# Patient Record
Sex: FEMALE | Race: BLACK OR AFRICAN AMERICAN | Employment: FULL TIME | URBAN - METROPOLITAN AREA
[De-identification: names, ages, dates, MRNs, and addresses within clinical notes are randomized per-mention and may not be internally consistent; named-entity substitution may affect disease eponyms.]

---

## 2017-10-09 ENCOUNTER — OFFICE VISIT (OUTPATIENT)
Dept: FAMILY MEDICINE CLINIC | Age: 37
End: 2017-10-09

## 2017-10-09 VITALS
WEIGHT: 136.4 LBS | SYSTOLIC BLOOD PRESSURE: 108 MMHG | HEIGHT: 67 IN | TEMPERATURE: 99.5 F | HEART RATE: 97 BPM | BODY MASS INDEX: 21.41 KG/M2 | OXYGEN SATURATION: 97 % | DIASTOLIC BLOOD PRESSURE: 69 MMHG | RESPIRATION RATE: 16 BRPM

## 2017-10-09 DIAGNOSIS — Z23 ENCOUNTER FOR IMMUNIZATION: ICD-10-CM
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
## 2017-10-09 DIAGNOSIS — M54.6 THORACIC SPINE PAIN: ICD-10-CM

## 2017-10-09 DIAGNOSIS — S16.1XXA STRAIN OF NECK MUSCLE, INITIAL ENCOUNTER: ICD-10-CM

## 2017-10-09 RX ORDER — NAPROXEN 375 MG/1
375 TABLET ORAL 2 TIMES DAILY WITH MEALS
Qty: 60 TAB | Refills: 1 | Status: SHIPPED | OUTPATIENT
Start: 2017-10-09 | End: 2018-03-28

## 2017-10-09 RX ORDER — CYCLOBENZAPRINE HCL 10 MG
10 TABLET ORAL
Qty: 15 TAB | Refills: 1 | Status: SHIPPED | OUTPATIENT
Start: 2017-10-09 | End: 2018-03-28

## 2017-10-09 RX ORDER — TRAMADOL HYDROCHLORIDE 50 MG/1
50 TABLET ORAL
Qty: 30 TAB | Refills: 1 | Status: SHIPPED | OUTPATIENT
Start: 2017-10-09 | End: 2018-03-28

## 2017-10-09 NOTE — MR AVS SNAPSHOT
Visit Information Date & Time Provider Department Dept. Phone Encounter #  
 10/9/2017  9:00 AM Nehemiah Bird [phone] [id number] Follow-up Instructions Return if symptoms worsen or fail to improve. Upcoming Health Maintenance Date Due INFLUENZA AGE 9 TO ADULT 8/1/2017 PAP AKA CERVICAL CYTOLOGY 12/28/2019 DTaP/Tdap/Td series (2 - Td) 8/13/2024 Allergies as of 10/9/2017  Review Complete On: 10/9/2017 By: Sharlene Ross., DO No Known Allergies Current Immunizations  Reviewed on 1/22/2013 Name Date Influenza Vaccine 1/22/2013 Influenza Vaccine (Quad) PF  Incomplete Influenza Vaccine Whole 1/1/2007 TD Vaccine 1/1/2005 Tdap 8/13/2014  8:30 AM  
  
 Not reviewed this visit You Were Diagnosed With   
  
 Codes Comments Strain of neck muscle, initial encounter    -  Primary ICD-10-CM: S16. Jon Noriegal ICD-9-CM: 847.0 Encounter for immunization     ICD-10-CM: D84 ICD-9-CM: V03.89 Thoracic spine pain     ICD-10-CM: M54.6 ICD-9-CM: 724.1 Vitals BP Pulse Temp Resp Height(growth percentile) Weight(growth percentile) 108/69 (BP 1 Location: Right arm, BP Patient Position: Sitting) 97 99.5 °F (37.5 °C) (Oral) 16 5' 7\" (1.702 m) 136 lb 6.4 oz (61.9 kg) LMP SpO2 BMI OB Status Smoking Status 09/25/2017 (Exact Date) 97% 21.36 kg/m2 Having regular periods Never Smoker BMI and BSA Data Body Mass Index Body Surface Area  
 21.36 kg/m 2 1.71 m 2 [facility]erred [facility] [phone] Your Updated Medication List  
  
   
This list is accurate as of: 10/9/17  9:54 AM.  Always use your most recent med list.  
  
  
  
  
 cyclobenzaprine 10 mg tablet Commonly known as:  FLEXERIL Take 1 Tab by mouth nightly. naproxen 375 mg tablet Commonly known as:  NAPROSYN  
 Take 1 Tab by mouth two (2) times daily (with meals). traMADol 50 mg tablet Commonly known as:  ULTRAM  
Take 1 Tab by mouth every eight (8) hours as needed for Pain. Max Daily Amount: 150 mg. Indications: Pain Prescriptions Printed Refills  
 traMADol (ULTRAM) 50 mg tablet 1 Sig: Take 1 Tab by mouth every eight (8) hours as needed for Pain. Max Daily Amount: 150 mg. Indications: Pain Class: Print Route: Oral  
  
Prescriptions Sent to Pharmacy Refills  
 naproxen (NAPROSYN) 375 mg tablet 1 Sig: Take 1 Tab by mouth two (2) times daily (with meals). Class: Normal  
 [facility] [address] [facility] Ph #: [phone] Route: Oral  
 cyclobenzaprine (FLEXERIL) 10 mg tablet 1 Sig: Take 1 Tab by mouth nightly. Class: Normal  
 Pharmacy: [address] [facility] Ph #: [phone] Route: Oral  
  
We Performed the Following INFLUENZA VIRUS VAC QUAD,SPLIT,PRESV FREE SYRINGE IM [id number] CPT(R)] Follow-up Instructions Return if symptoms worsen or fail to improve. Introducing [facility]! 3 [facility] introduces Symform patient portal. Now you can access parts of your medical record, email your doctor's office, and request medication refills online. 1. In your internet browser, go to [email or web address] 2. Click on the First Time User? Click Here link in the Sign In box. You will see the New Member Sign Up page. 3. Enter your Symform Access Code exactly as it appears below. You will not need to use this code after youve completed the sign-up process. If you do not sign up before the expiration date, you must request a new code. · Symform Access Code: [email or web address] Expires: 1/7/2018  9:47 AM 
 
4.  Enter the last four digits of your Social Security Number (xxxx) and Date of Birth (mm/dd/yyyy) as indicated and click Submit. You will be taken to the next sign-up page. 5. Create a 8hands ID. This will be your 8hands login ID and cannot be changed, so think of one that is secure and easy to remember. 6. Create a 8hands password. You can change your password at any time. 7. Enter your Password Reset Question and Answer. This can be used at a later time if you forget your password. 8. Enter your e-mail address. You will receive e-mail notification when new information is available in [address] 9. Click Sign Up. You can now view and download portions of your medical record. 10. Click the Download Summary menu link to download a portable copy of your medical information. If you have questions, please visit the Frequently Asked Questions section of the 8hands website. Remember, 8hands is NOT to be used for urgent needs. For medical emergencies, dial 911. Now available from your iPhone and Android! Please provide this summary of care documentation to your next provider. Your primary care clinician is listed as [id number] [facility]. If you have any questions after today's visit, please call [phone].
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
## 2018-02-23 ENCOUNTER — HOSPITAL ENCOUNTER (EMERGENCY)
Age: 38
Discharge: HOME | End: 2018-02-23
Attending: EMERGENCY MEDICINE
Payer: SUBSIDIZED

## 2018-02-23 ENCOUNTER — APPOINTMENT (OUTPATIENT)
Dept: GENERAL RADIOLOGY | Age: 38
End: 2018-02-23
Attending: EMERGENCY MEDICINE
Payer: SUBSIDIZED

## 2018-02-23 VITALS
SYSTOLIC BLOOD PRESSURE: 131 MMHG | WEIGHT: 135 LBS | TEMPERATURE: 99.4 F | DIASTOLIC BLOOD PRESSURE: 94 MMHG | OXYGEN SATURATION: 100 % | BODY MASS INDEX: 21.14 KG/M2 | HEART RATE: 68 BPM | RESPIRATION RATE: 16 BRPM

## 2018-02-23 DIAGNOSIS — R05.9 COUGH: Primary | ICD-10-CM

## 2018-02-23 DIAGNOSIS — J98.8 CONGESTION OF RESPIRATORY TRACT: ICD-10-CM

## 2018-02-23 DIAGNOSIS — R07.9 CHEST PAIN, UNSPECIFIED TYPE: ICD-10-CM

## 2018-02-23 LAB — HCG UR QL: NEGATIVE

## 2018-02-23 PROCEDURE — 74011250637 HC RX REV CODE- 250/637: Performed by: EMERGENCY MEDICINE

## 2018-02-23 PROCEDURE — 94640 AIRWAY INHALATION TREATMENT: CPT

## 2018-02-23 PROCEDURE — 81025 URINE PREGNANCY TEST: CPT | Performed by: EMERGENCY MEDICINE

## 2018-02-23 PROCEDURE — 93005 ELECTROCARDIOGRAM TRACING: CPT

## 2018-02-23 PROCEDURE — 74011000250 HC RX REV CODE- 250: Performed by: EMERGENCY MEDICINE

## 2018-02-23 PROCEDURE — 99284 EMERGENCY DEPT VISIT MOD MDM: CPT

## 2018-02-23 PROCEDURE — 77030029684 HC NEB SM VOL KT MONA -A

## 2018-02-23 PROCEDURE — 71046 X-RAY EXAM CHEST 2 VIEWS: CPT

## 2018-02-23 RX ORDER — ACETAMINOPHEN 500 MG
1000 TABLET ORAL
Status: COMPLETED | OUTPATIENT
Start: 2018-02-23 | End: 2018-02-23

## 2018-02-23 RX ORDER — LORATADINE 10 MG/1
10 TABLET ORAL DAILY
Qty: 30 TAB | Refills: 0 | Status: SHIPPED | OUTPATIENT
Start: 2018-02-23 | End: 2018-03-25

## 2018-02-23 RX ORDER — IPRATROPIUM BROMIDE AND ALBUTEROL SULFATE 2.5; .5 MG/3ML; MG/3ML
3 SOLUTION RESPIRATORY (INHALATION)
Status: DISPENSED | OUTPATIENT
Start: 2018-02-23 | End: 2018-02-23

## 2018-02-23 RX ORDER — GUAIFENESIN 600 MG/1
600 TABLET, EXTENDED RELEASE ORAL 2 TIMES DAILY
Qty: 15 TAB | Refills: 0 | Status: SHIPPED | OUTPATIENT
Start: 2018-02-23 | End: 2018-03-28

## 2018-02-23 RX ORDER — AZITHROMYCIN 250 MG/1
TABLET, FILM COATED ORAL
Qty: 6 TAB | Refills: 0 | Status: SHIPPED | OUTPATIENT
Start: 2018-02-23 | End: 2018-03-28

## 2018-02-23 RX ORDER — ALBUTEROL SULFATE 90 UG/1
2 AEROSOL, METERED RESPIRATORY (INHALATION)
Qty: 1 INHALER | Refills: 0 | Status: SHIPPED | OUTPATIENT
Start: 2018-02-23 | End: 2018-03-28

## 2018-02-23 RX ADMIN — ACETAMINOPHEN 1000 MG: 500 TABLET ORAL at 18:22

## 2018-02-23 RX ADMIN — IPRATROPIUM BROMIDE AND ALBUTEROL SULFATE 3 ML: .5; 3 SOLUTION RESPIRATORY (INHALATION) at 18:23

## 2018-02-23 NOTE — ED PROVIDER NOTES
EMERGENCY DEPARTMENT HISTORY AND PHYSICAL EXAM    6:15 PM      Date: 2/23/2018  Patient Name: Jose Dial    History of Presenting Illness     Chief Complaint   Patient presents with    Chest Pain    Cough    Shortness of Breath         History Provided By: Patient    Chief Complaint: Chest Pain  Duration: 2 Weeks  Timing:  Intermittent  Location: Mid chest  Quality: Tightness  Severity: Moderate  Modifying Factors: Exacerbated with cough, no relief from OTC meds  Associated Symptoms: Cough      Additional History (Context):6:24 PM Jose Dial is a 40 y.o. female with h/o Hypoglycemia who presents to ED complaining of intermittent moderate mid chest tightness associated and exacerbated with cough onset 2 weeks ago. Patient states that she had the FLU over 2 weeks ago and the cough came on at the end of the FLU sx. States that she hs tried OTC medication with no relief. Has had similar chest tightness and cough with acute bronchitis. No other concerns or symptoms at this time. PCP: Nay Cui., DO      Current Facility-Administered Medications   Medication Dose Route Frequency Provider Last Rate Last Dose    albuterol-ipratropium (DUO-NEB) 2.5 MG-0.5 MG/3 ML  3 mL Nebulization Q30MIN Itzel Edwards MD   3 mL at 02/23/18 1824     Current Outpatient Prescriptions   Medication Sig Dispense Refill    azithromycin (ZITHROMAX) 250 mg tablet Take 2 tablets PO on day 1 then 1 tab PO every day for days 2-5 6 Tab 0    albuterol (PROVENTIL HFA, VENTOLIN HFA, PROAIR HFA) 90 mcg/actuation inhaler Take 2 Puffs by inhalation every four (4) hours as needed for Wheezing. 1 Inhaler 0    loratadine (CLARITIN) 10 mg tablet Take 1 Tab by mouth daily for 30 days. 30 Tab 0    inhalational spacing device 1 Each by Does Not Apply route as needed. 1 Device 0    guaiFENesin ER (MUCINEX) 600 mg ER tablet Take 1 Tab by mouth two (2) times a day.  15 Tab 0    naproxen (NAPROSYN) 375 mg tablet Take 1 Tab by mouth two (2) times daily (with meals). 60 Tab 1    cyclobenzaprine (FLEXERIL) 10 mg tablet Take 1 Tab by mouth nightly. 15 Tab 1    traMADol (ULTRAM) 50 mg tablet Take 1 Tab by mouth every eight (8) hours as needed for Pain. Max Daily Amount: 150 mg. Indications: Pain 30 Tab 1       Past History     Past Medical History:  Past Medical History:   Diagnosis Date    Acne     Anemia NEC     Calculus of kidney     Depression     anxiety    HPV in female     HSV infection     Hypoglycemia     PID (pelvic inflammatory disease)        Past Surgical History:  Past Surgical History:   Procedure Laterality Date    COSMETIC RATE ADJ  03/02/11    abdominal plastic    HX LIPOSUCTION  3/15/13    HX OTHER SURGICAL  05/04/12    Calf implant    HX TUBAL LIGATION  9/18/12       Family History:  Family History   Problem Relation Age of Onset    Hypertension Maternal Grandmother     Hypertension Maternal Grandfather        Social History:  Social History   Substance Use Topics    Smoking status: Never Smoker    Smokeless tobacco: Never Used    Alcohol use Yes      Comment: Occassionally. Allergies:  No Known Allergies      Review of Systems       Review of Systems   Constitutional: Negative for activity change, fatigue and fever. HENT: Negative for congestion and rhinorrhea. Eyes: Negative for visual disturbance. Respiratory: Positive for cough. Negative for shortness of breath. Cardiovascular: Positive for chest pain (Tightness). Negative for palpitations. Gastrointestinal: Negative for abdominal pain, diarrhea, nausea and vomiting. Genitourinary: Negative for dysuria and hematuria. Musculoskeletal: Negative for back pain. Skin: Negative for rash. Neurological: Negative for dizziness, weakness and light-headedness. Psychiatric/Behavioral: Negative for agitation. All other systems reviewed and are negative.         Physical Exam     Visit Vitals    /81 (BP Patient Position: At rest)    Pulse 68    Temp 99.4 °F (37.4 °C)    Resp 16    Wt 61.2 kg (135 lb)    LMP 02/19/2018 (Exact Date)    SpO2 100%    BMI 21.14 kg/m2         Physical Exam   Constitutional: She appears well-developed and well-nourished. No distress. HENT:   Head: Normocephalic and atraumatic. Right Ear: External ear normal.   Left Ear: External ear normal.   Nose: Nose normal.   Mouth/Throat: Oropharynx is clear and moist.   Eyes: Conjunctivae and EOM are normal. Pupils are equal, round, and reactive to light. No scleral icterus. Neck: Normal range of motion. Neck supple. No JVD present. No tracheal deviation present. No thyromegaly present. Cardiovascular: Normal rate and regular rhythm. Exam reveals no friction rub. No murmur heard. Pulmonary/Chest: Effort normal and breath sounds normal. No stridor. She exhibits no tenderness. Abdominal: Soft. Bowel sounds are normal. She exhibits no distension. There is no tenderness. There is no rebound and no guarding. Musculoskeletal: Normal range of motion. She exhibits no edema or tenderness. Lymphadenopathy:     She has no cervical adenopathy. Neurological: She is alert. No cranial nerve deficit. Coordination normal.   Skin: Skin is warm and dry. Psychiatric: She has a normal mood and affect. Her behavior is normal. Judgment and thought content normal.   Nursing note and vitals reviewed.         Diagnostic Study Results     Labs -  Recent Results (from the past 12 hour(s))   EKG, 12 LEAD, INITIAL    Collection Time: 02/23/18  5:43 PM   Result Value Ref Range    Ventricular Rate 71 BPM    Atrial Rate 71 BPM    P-R Interval 168 ms    QRS Duration 78 ms    Q-T Interval 392 ms    QTC Calculation (Bezet) 425 ms    Calculated P Axis 30 degrees    Calculated R Axis 76 degrees    Calculated T Axis 71 degrees    Diagnosis       Normal sinus rhythm  Normal ECG  No previous ECGs available         Radiologic Studies -   XR CHEST PA LAT    Preliminary Result  IMPRESSION:    No acute process. Interpreted by Surjit Duckworth MD 6:38 PM      Medical Decision Making   I am the first provider for this patient. I reviewed the vital signs, available nursing notes, past medical history, past surgical history, family history and social history. Vital Signs-Reviewed the patient's vital signs. Pulse Oximetry Analysis -  100% on room air , Normal    EKG: Interpreted by the EP. Time Interpreted: 17:43   Rate: 71 bpm   Rhythm: Sinus Rhythm    Interpretation:Normal axis and intervals. No acute ischemic abnormalities. Records Reviewed: Nursing Notes and Old Medical Records (Time of Review: 6:15 PM)    ED Course: Progress Notes, Reevaluation, and Consults:  6:48 CXR and EKG negative. Symptomatic treatment and FU with PCP.   6:49 PM I have assessed the patient who will be discharged in stable condition. Patient is to return to emergency department if any new or worsening condition. I have given the patient instructions for discharge and follow-up. Patient understands and verbalizes agreement with plan, diagnosis, discharge, and follow-up. Provider Notes (Medical Decision Making):   Patient with recent URI persistent  2 weeks of cough and chest tightness with cough. No hx of exertional CP or CAD. No hx of calf pain, DVT or PE, or travel. No constant chest pain only with cough. Has hx of the same with prior bronchitis with similar chest pain. Diagnosis     Clinical Impression:   1. Cough    2. Congestion of respiratory tract    3.  Chest pain, unspecified type        Disposition: DC    Follow-up Information     Follow up With Details Comments Yung Gardiner., DO Call in 1 day Follow Up From Emergency Department [id number] [place] [address]  [phone]             Patient's Medications   Start Taking    ALBUTEROL (PROVENTIL HFA, VENTOLIN HFA, PROAIR HFA) 90 MCG/ACTUATION INHALER    Take 2 Puffs by inhalation every four (4) hours as needed for Wheezing. AZITHROMYCIN (ZITHROMAX) 250 MG TABLET    Take 2 tablets PO on day 1 then 1 tab PO every day for days 2-5    GUAIFENESIN ER (MUCINEX) 600 MG ER TABLET    Take 1 Tab by mouth two (2) times a day. INHALATIONAL SPACING DEVICE    1 Each by Does Not Apply route as needed. LORATADINE (CLARITIN) 10 MG TABLET    Take 1 Tab by mouth daily for 30 days. Continue Taking    CYCLOBENZAPRINE (FLEXERIL) 10 MG TABLET    Take 1 Tab by mouth nightly. NAPROXEN (NAPROSYN) 375 MG TABLET    Take 1 Tab by mouth two (2) times daily (with meals). TRAMADOL (ULTRAM) 50 MG TABLET    Take 1 Tab by mouth every eight (8) hours as needed for Pain. Max Daily Amount: 150 mg. Indications: Pain   These Medications have changed    No medications on file   Stop Taking    No medications on file     _______________________________    Attestations:  Jin Madsen acting as a scribe for and in the presence of Ector Pozo MD      February 23, 2018 at [facility] PM       Provider Attestation:      I personally performed the services described in the documentation, reviewed the documentation, as recorded by the scribe in my presence, and it accurately and completely records my words and actions.  February 23, 2018 at 6:15 PM - Ector Pozo MD    _______________________________

## 2018-02-23 NOTE — DISCHARGE INSTRUCTIONS
Chest Pain: Care Instructions  Your Care Instructions    There are many things that can cause chest pain. Some are not serious and will get better on their own in a few days. But some kinds of chest pain need more testing and treatment. Your doctor may have recommended a follow-up visit in the next 8 to 12 hours. If you are not getting better, you may need more tests or treatment. Even though your doctor has released you, you still need to watch for any problems. The doctor carefully checked you, but sometimes problems can develop later. If you have new symptoms or if your symptoms do not get better, get medical care right away. If you have worse or different chest pain or pressure that lasts more than 5 minutes or you passed out (lost consciousness), call 911 or seek other emergency help right away. A medical visit is only one step in your treatment. Even if you feel better, you still need to do what your doctor recommends, such as going to all suggested follow-up appointments and taking medicines exactly as directed. This will help you recover and help prevent future problems. How can you care for yourself at home? · Rest until you feel better. · Take your medicine exactly as prescribed. Call your doctor if you think you are having a problem with your medicine. · Do not drive after taking a prescription pain medicine. When should you call for help? Call 911 if:  ? · You passed out (lost consciousness). ? · You have severe difficulty breathing. ? · You have symptoms of a heart attack. These may include:  ¨ Chest pain or pressure, or a strange feeling in your chest.  ¨ Sweating. ¨ Shortness of breath. ¨ Nausea or vomiting. ¨ Pain, pressure, or a strange feeling in your back, neck, jaw, or upper belly or in one or both shoulders or arms. ¨ Lightheadedness or sudden weakness. ¨ A fast or irregular heartbeat.   After you call 911, the  may tell you to chew 1 adult-strength or 2 to 4 low-dose aspirin. Wait for an ambulance. Do not try to drive yourself. ?Call your doctor today if:  ? · You have any trouble breathing. ? · Your chest pain gets worse. ? · You are dizzy or lightheaded, or you feel like you may faint. ? · You are not getting better as expected. ? · You are having new or different chest pain. Where can you learn more? Go to http://tanja-vinita.info/. Enter A120 in the search box to learn more about \"Chest Pain: Care Instructions. \"  Current as of: March 20, 2017  Content Version: 11.4  © 6312-1794 [employer]. Care instructions adapted under license by [employer] (which disclaims liability or warranty for this information). If you have questions about a medical condition or this instruction, always ask your healthcare professional. [place] [address] any warranty or liability for your use of this information. Cough: Care Instructions  Your Care Instructions    A cough is your body's response to something that bothers your throat or airways. Many things can cause a cough. You might cough because of a cold or the flu, bronchitis, or asthma. Smoking, postnasal drip, allergies, and stomach acid that backs up into your throat also can cause coughs. A cough is a symptom, not a disease. Most coughs stop when the cause, such as a cold, goes away. You can take a few steps at home to cough less and feel better. Follow-up care is a key part of your treatment and safety. Be sure to make and go to all appointments, and call your doctor if you are having problems. It's also a good idea to know your test results and keep a list of the medicines you take. How can you care for yourself at home? · Drink lots of water and other fluids. This helps thin the mucus and soothes a dry or sore throat. Honey or lemon juice in hot water or tea may ease a dry cough. · Take cough medicine as directed by your doctor.   · Prop up your head on pillows to help you breathe and ease a dry cough. · Try cough drops to soothe a dry or sore throat. Cough drops don't stop a cough. Medicine-flavored cough drops are no better than candy-flavored drops or hard candy. · Do not smoke. Avoid secondhand smoke. If you need help quitting, talk to your doctor about stop-smoking programs and medicines. These can increase your chances of quitting for good. When should you call for help? Call 911 anytime you think you may need emergency care. For example, call if:  ? · You have severe trouble breathing. ?Call your doctor now or seek immediate medical care if:  ? · You cough up blood. ? · You have new or worse trouble breathing. ? · You have a new or higher fever. ? · You have a new rash. ? Watch closely for changes in your health, and be sure to contact your doctor if:  ? · You cough more deeply or more often, especially if you notice more mucus or a change in the color of your mucus. ? · You have new symptoms, such as a sore throat, an earache, or sinus pain. ? · You do not get better as expected. Where can you learn more? Go to http://tanja-vinita.info/. Enter D279 in the search box to learn more about \"Cough: Care Instructions. \"  Current as of: May 12, 2017  Content Version: 11.4  © 4613-7497 [employer]. Care instructions adapted under license by [employer] (which disclaims liability or warranty for this information). If you have questions about a medical condition or this instruction, always ask your healthcare professional. [address] any warranty or liability for your use of this information.

## 2018-02-24 LAB
ATRIAL RATE: 71 BPM
CALCULATED P AXIS, ECG09: 30 DEGREES
CALCULATED R AXIS, ECG10: 76 DEGREES
CALCULATED T AXIS, ECG11: 71 DEGREES
DIAGNOSIS, 93000: NORMAL
P-R INTERVAL, ECG05: 168 MS
Q-T INTERVAL, ECG07: 392 MS
QRS DURATION, ECG06: 78 MS
QTC CALCULATION (BEZET), ECG08: 425 MS
VENTRICULAR RATE, ECG03: 71 BPM

## 2018-03-28 ENCOUNTER — OFFICE VISIT (OUTPATIENT)
Dept: FAMILY MEDICINE CLINIC | Age: 38
End: 2018-03-28

## 2018-03-28 VITALS
HEART RATE: 87 BPM | BODY MASS INDEX: 22.32 KG/M2 | SYSTOLIC BLOOD PRESSURE: 124 MMHG | HEIGHT: 67 IN | OXYGEN SATURATION: 95 % | DIASTOLIC BLOOD PRESSURE: 80 MMHG | TEMPERATURE: 98.5 F | WEIGHT: 142.2 LBS | RESPIRATION RATE: 14 BRPM

## 2018-03-28 DIAGNOSIS — J06.9 VIRAL UPPER RESPIRATORY TRACT INFECTION: Primary | ICD-10-CM

## 2018-03-28 NOTE — PROGRESS NOTES
Chaney Duverney is a 40 y.o. female presents today for follow up on ED visit for bronchitis. Patient reports that the symptoms are no longer present. Patient reports of left arm pain for 3 days. Patient reports that it hurts to bend her arm. Pt is in Room # 4          1. Have you been to the ER, urgent care clinic since your last visit? Hospitalized since your last visit? Yes When: 2/23/18 Where: [facility] ED Reason for visit: bronchitis    2. Have you seen or consulted any other health care providers outside of the [address] [place] [address] since your last visit? Include any pap smears or colon screening. No     Health Maintenance reviewed - current.

## 2018-03-28 NOTE — MR AVS SNAPSHOT
Mia Pulse 
 
 
 [address] [facility] [address]  [place] [address] [id number] 
[phone] Patient: Jose Dial MRN: [id number] :[id number] Visit Information Date & Time Provider Department Dept. Phone Encounter #  
 3/28/2018  1:00 PM Nehemiah Bird [phone] [id number] Follow-up Instructions Return if symptoms worsen or fail to improve. Follow-up and Disposition History Your Appointments 2018 12:30 PM  
[place] [address] [address] [place] [address]) Appt Note: CPE  
 [address] [facility] [address] [place] [address]  
  
   
 [address] [facility] [address] [place] [address] [address] Upcoming Health Maintenance Date Due  
 PAP AKA CERVICAL CYTOLOGY 2019 DTaP/Tdap/Td series (2 - Td) 2024 Allergies as of 3/28/2018  Review Complete On: 2018 By: Jose Larry No Known Allergies Current Immunizations  Reviewed on 2013 Name Date Influenza Vaccine 2013 Influenza Vaccine (Quad) PF 10/9/2017 Influenza Vaccine Whole 2007 TD Vaccine 2005 Tdap 2014  8:30 AM  
  
 Not reviewed this visit You Were Diagnosed With   
  
 Codes Comments Viral upper respiratory tract infection    -  Primary ICD-10-CM: J06.9, B97.89 ICD-9-CM: 465.9 Vitals BP Pulse Temp Resp Height(growth percentile) Weight(growth percentile) 124/80 (BP 1 Location: Right arm, BP Patient Position: Sitting) 87 98.5 °F (36.9 °C) (Oral) 14 5' 7\" (1.702 m) 142 lb 3.2 oz (64.5 kg) LMP SpO2 BMI OB Status Smoking Status 2018 (Exact Date) 95% 22.27 kg/m2 Having regular periods Never Smoker BMI and BSA Data Body Mass Index Body Surface Area  
 22.27 kg/m 2 1.75 m 2 Preferred Pharmacy Pharmacy Name Phone [facility] [phone] Your Updated Medication List  
  
Notice  As of 3/28/2018  1:45 PM  
 You have not been prescribed any medications. Follow-up Instructions Return if symptoms worsen or fail to improve. Introducing [facility]! Mehran Cardona introduces REQQI patient portal. Now you can access parts of your medical record, email your doctor's office, and request medication refills online. 1. In your internet browser, go to [email or web address] 2. Click on the First Time User? Click Here link in the Sign In box. You will see the New Member Sign Up page. 3. Enter your REQQI Access Code exactly as it appears below. You will not need to use this code after youve completed the sign-up process. If you do not sign up before the expiration date, you must request a new code. · REQQI Access Code: [email or web address] Expires: 5/24/2018  7:50 PM 
 
4. Enter the last four digits of your Social Security Number (xxxx) and Date of Birth (mm/dd/yyyy) as indicated and click Submit. You will be taken to the next sign-up page. 5. Create a REQQI ID. This will be your REQQI login ID and cannot be changed, so think of one that is secure and easy to remember. 6. Create a REQQI password. You can change your password at any time. 7. Enter your Password Reset Question and Answer. This can be used at a later time if you forget your password. 8. Enter your e-mail address. You will receive e-mail notification when new information is available in [address] 9. Click Sign Up. You can now view and download portions of your medical record. 10. Click the Download Summary menu link to download a portable copy of your medical information. If you have questions, please visit the Frequently Asked Questions section of the REQQI website. Remember, REQQI is NOT to be used for urgent needs. For medical emergencies, dial 911. Now available from your iPhone and Android! Please provide this summary of care documentation to your next provider. Your primary care clinician is listed as [phone] [facility]. If you have any questions after today's visit, please call [phone].

## 2018-03-28 NOTE — PROGRESS NOTES
Lori Palumbo is a 40 y.o.  female and presents with    Chief Complaint   Patient presents with   Pieter Olguin     Had a cold 1 mo ago. Now clear      Subjective: Additional Concerns: There are no active problems to display for this patient. No Known Allergies  Past Medical History:   Diagnosis Date    Acne     Anemia NEC     Calculus of kidney     Depression     anxiety    HPV in female     HSV infection     Hypoglycemia     PID (pelvic inflammatory disease)      Past Surgical History:   Procedure Laterality Date    COSMETIC RATE ADJ  03/02/11    abdominal plastic    HX LIPOSUCTION  3/15/13    HX OTHER SURGICAL  05/04/12    Calf implant    HX TUBAL LIGATION  9/18/12     Family History   Problem Relation Age of Onset    Hypertension Maternal Grandmother     Hypertension Maternal Grandfather      Social History   Substance Use Topics    Smoking status: Never Smoker    Smokeless tobacco: Never Used    Alcohol use Yes      Comment: Occassionally. ROS       All other systems reviewed and are negative. Objective:  Vitals:    03/28/18 1331   BP: 124/80   Pulse: 87   Resp: 14   Temp: 98.5 °F (36.9 °C)   TempSrc: Oral   SpO2: 95%   Weight: 142 lb 3.2 oz (64.5 kg)   Height: 5' 7\" (1.702 m)   PainSc:   8   PainLoc: Arm   LMP: 03/23/2018                 alert, well appearing, and in no distress and oriented to person, place, and time  Neck - supple, no significant adenopathy  Lymphatics - no palpable lymphadenopathy, no hepatosplenomegaly  Chest - clear to auscultation, no wheezes, rales or rhonchi, symmetric air entry  Heart - normal rate, regular rhythm, normal S1, S2, no murmurs, rubs, clicks or gallops        LABS     TESTS      Assessment/Plan:    Healthy girl no tx needed       Lab review: labs are reviewed, up to date and normal        I have discussed the diagnosis with the patient and the intended plan as seen in the above orders.   The patient has received an after-visit summary and questions were answered concerning future plans. I have discussed medication side effects and warnings with the patient as well. I have reviewed the plan of care with the patient, accepted their input and they are in agreement with the treatment goals. Follow-up Disposition:  Return if symptoms worsen or fail to improve.

## 2018-10-29 ENCOUNTER — OFFICE VISIT (OUTPATIENT)
Dept: FAMILY MEDICINE CLINIC | Age: 38
End: 2018-10-29

## 2018-10-29 VITALS
HEART RATE: 79 BPM | RESPIRATION RATE: 18 BRPM | SYSTOLIC BLOOD PRESSURE: 110 MMHG | DIASTOLIC BLOOD PRESSURE: 73 MMHG | BODY MASS INDEX: 21.69 KG/M2 | WEIGHT: 138.2 LBS | OXYGEN SATURATION: 99 % | HEIGHT: 67 IN | TEMPERATURE: 98.2 F

## 2018-10-29 DIAGNOSIS — F41.9 ANXIETY: ICD-10-CM

## 2018-10-29 DIAGNOSIS — R03.0 ELEVATED BP WITHOUT DIAGNOSIS OF HYPERTENSION: Primary | ICD-10-CM

## 2018-10-29 DIAGNOSIS — Z23 ENCOUNTER FOR IMMUNIZATION: ICD-10-CM

## 2018-10-29 RX ORDER — ALPRAZOLAM 0.5 MG/1
0.5 TABLET ORAL
Qty: 30 TAB | Refills: 0 | Status: SHIPPED | OUTPATIENT
Start: 2018-10-29 | End: 2018-11-27 | Stop reason: SDUPTHER

## 2018-10-29 NOTE — PROGRESS NOTES
Room #  4    SUBJECTIVE:    Karli Mcintyre is a 40 y.o. female who presents today for follow up for ar pain and EKg    1. Have you been to the ER, urgent care clinic since your last visit? Hospitalized since your last visit? NO    2. Have you seen or consulted any other health care providers outside of the [address] [place] [address] since your last visit? Include any pap smears or colon screening. NO  When :  Reason:    Health Maintenance reviewed Yes    Health Maintenance Due   Topic Date Due    Influenza Age 5 to Adult  08/01/2018              Karli Mcintyre is a 40 y.o. female who presents for routine immunizations. She denies any symptoms , reactions or allergies that would exclude them from being immunized today. Risks and adverse reactions were discussed and the VIS was given to them. All questions were addressed. She was observed for 20 min post injection. There were no reactions observed.     Nayely Funes LPN

## 2018-10-29 NOTE — PROGRESS NOTES
Ivana Dubon is a 40 y.o.  female and presents with    Chief Complaint   Patient presents with    Chest Pain           Subjective:  Having episodes of aching pain in her left arm for over 6 wk comes and goes  Wakes her up at night  High stess at home and job      Additional Concerns: There are no active problems to display for this patient. No Known Allergies  Past Medical History:   Diagnosis Date    Acne     Anemia NEC     Calculus of kidney     Depression     anxiety    HPV in female     HSV infection     Hypoglycemia     PID (pelvic inflammatory disease)      Past Surgical History:   Procedure Laterality Date    COSMETIC RATE ADJ  03/02/11    abdominal plastic    HX LIPOSUCTION  3/15/13    HX OTHER SURGICAL  05/04/12    Calf implant    HX TUBAL LIGATION  9/18/12     Family History   Problem Relation Age of Onset    Hypertension Maternal Grandmother     Hypertension Maternal Grandfather      Social History     Tobacco Use    Smoking status: Never Smoker    Smokeless tobacco: Never Used   Substance Use Topics    Alcohol use: Yes     Comment: Occassionally. ROS       All other systems reviewed and are negative. Objective:  Vitals:    10/29/18 1251   BP: 110/73   Pulse: 79   Resp: 18   Temp: 98.2 °F (36.8 °C)   TempSrc: Oral   SpO2: 99%   Weight: 138 lb 3.2 oz (62.7 kg)   Height: 5' 7\" (1.702 m)   PainSc:   8   PainLoc: Arm   LMP: 10/24/2018                 alert, well appearing, and in no distress and oriented to person, place, and time  Chest - clear to auscultation, no wheezes, rales or rhonchi, symmetric air entry  Heart - normal rate, regular rhythm, normal S1, S2, no murmurs, rubs, clicks or gallops  Abdomen - soft, nontender, nondistended, no masses or organomegaly  Shoulders, arm, elbow all benign        LABS     TESTS  ekg  nsr      Assessment/Plan:    Intermittent left arm aching   Etio? ? Stress   Will try a few xanax and f/u     Lab review: Diagnoses and all orders for this visit:    1. Elevated BP without diagnosis of hypertension  -     AMB POC EKG ROUTINE W/ 12 LEADS, INTER & REP    2. Encounter for immunization  -     INFLUENZA VIRUS VAC QUAD,SPLIT,PRESV FREE SYRINGE IM  -     GA IMMUNIZ ADMIN,1 SINGLE/COMB VAC/TOXOID          I have discussed the diagnosis with the patient and the intended plan as seen in the above orders. The patient has received an after-visit summary and questions were answered concerning future plans. I have discussed medication side effects and warnings with the patient as well. I have reviewed the plan of care with the patient, accepted their input and they are in agreement with the treatment goals.      Follow-up Disposition: Not on File

## 2018-11-27 ENCOUNTER — OFFICE VISIT (OUTPATIENT)
Dept: FAMILY MEDICINE CLINIC | Age: 38
End: 2018-11-27

## 2018-11-27 VITALS
OXYGEN SATURATION: 99 % | HEIGHT: 67 IN | HEART RATE: 84 BPM | TEMPERATURE: 97.4 F | DIASTOLIC BLOOD PRESSURE: 74 MMHG | WEIGHT: 139.6 LBS | RESPIRATION RATE: 16 BRPM | BODY MASS INDEX: 21.91 KG/M2 | SYSTOLIC BLOOD PRESSURE: 114 MMHG

## 2018-11-27 DIAGNOSIS — F41.9 ANXIETY: ICD-10-CM

## 2018-11-27 DIAGNOSIS — Z00.00 ROUTINE GENERAL MEDICAL EXAMINATION AT A HEALTH CARE FACILITY: Primary | ICD-10-CM

## 2018-11-27 RX ORDER — ALPRAZOLAM 0.5 MG/1
0.5 TABLET ORAL
Qty: 30 TAB | Refills: 0 | Status: SHIPPED | OUTPATIENT
Start: 2018-11-27 | End: 2019-01-17 | Stop reason: SDUPTHER

## 2018-11-27 NOTE — PROGRESS NOTES
1. Have you been to the ER, urgent care clinic since your last visit? Hospitalized since your last visit? No    2. Have you seen or consulted any other health care providers outside of the [address] [place] [address] since your last visit? Include any pap smears or colon screening.  No

## 2018-11-27 NOTE — PROGRESS NOTES
Devika Dexter is a 40 y.o.  female and presents with    Chief Complaint   Patient presents with    Arm Pain           Subjective:  Here for f/u of arm christopher  This has totally resolved with a few xzanax        Additional Concerns: There are no active problems to display for this patient. Current Outpatient Medications   Medication Sig Dispense Refill    ALPRAZolam (XANAX) 0.5 mg tablet Take 1 Tab by mouth two (2) times daily as needed for Anxiety. Max Daily Amount: 1 mg. 30 Tab 0     No Known Allergies  Past Medical History:   Diagnosis Date    Acne     Anemia NEC     Calculus of kidney     Depression     anxiety    HPV in female     HSV infection     Hypoglycemia     PID (pelvic inflammatory disease)      Past Surgical History:   Procedure Laterality Date    COSMETIC RATE ADJ  03/02/11    abdominal plastic    HX LIPOSUCTION  3/15/13    HX OTHER SURGICAL  05/04/12    Calf implant    HX TUBAL LIGATION  9/18/12     Family History   Problem Relation Age of Onset    Hypertension Maternal Grandmother     Hypertension Maternal Grandfather      Social History     Tobacco Use    Smoking status: Never Smoker    Smokeless tobacco: Never Used   Substance Use Topics    Alcohol use: Yes     Comment: Occassionally. ROS       All other systems reviewed and are negative.       Objective:  Vitals:    11/27/18 1250   BP: 114/74   Pulse: 84   Resp: 16   Temp: 97.4 °F (36.3 °C)   TempSrc: Oral   SpO2: 99%   Weight: 139 lb 9.6 oz (63.3 kg)   Height: 5' 7\" (1.702 m)   PainSc:   0 - No pain   LMP: 11/10/2018                 alert, well appearing, and in no distress and oriented to person, place, and time  Chest - clear to auscultation, no wheezes, rales or rhonchi, symmetric air entry  Heart - normal rate, regular rhythm, normal S1, S2, no murmurs, rubs, clicks or gallops  Abdomen - soft, nontender, nondistended, no masses or organomegaly        LABS     TESTS  ekg nsr    Assessment/Plan:    Arm pain prob stress related  Have a few xanax only if needed  F/u 1 or 2 mo for PE    Lab review:     Diagnoses and all orders for this visit:    1. Routine general medical examination at a health care facility  -     LIPID PANEL; Future  -     CBC WITH AUTOMATED DIFF; Future  -     METABOLIC PANEL, COMPREHENSIVE; Future  -     URINALYSIS W/ RFLX MICROSCOPIC; Future  -     TSH 3RD GENERATION; Future    2. Anxiety  -     ALPRAZolam (XANAX) 0.5 mg tablet; Take 1 Tab by mouth two (2) times daily as needed for Anxiety. Max Daily Amount: 1 mg.  -     LIPID PANEL; Future  -     CBC WITH AUTOMATED DIFF; Future  -     METABOLIC PANEL, COMPREHENSIVE; Future  -     URINALYSIS W/ RFLX MICROSCOPIC; Future  -     TSH 3RD GENERATION; Future          I have discussed the diagnosis with the patient and the intended plan as seen in the above orders. The patient has received an after-visit summary and questions were answered concerning future plans. I have discussed medication side effects and warnings with the patient as well. I have reviewed the plan of care with the patient, accepted their input and they are in agreement with the treatment goals. Follow-up Disposition:  Return in about 2 months (around 1/27/2019) for physical, labs today.

## 2019-01-09 ENCOUNTER — HOSPITAL ENCOUNTER (OUTPATIENT)
Dept: LAB | Age: 39
Discharge: HOME | End: 2019-01-09

## 2019-01-09 LAB — SENTARA SPECIMEN COL,SENBCF: NORMAL

## 2019-01-09 PROCEDURE — 99001 SPECIMEN HANDLING PT-LAB: CPT

## 2019-01-10 LAB
A-G RATIO,AGRAT: 1.8 RATIO (ref 1.1–2.6)
ABSOLUTE LYMPHOCYTE COUNT, 10803: 1.5 K/UL (ref 1–4.8)
ALBUMIN SERPL-MCNC: 4.6 G/DL (ref 3.5–5)
ALP SERPL-CCNC: 57 U/L (ref 25–115)
ALT SERPL-CCNC: 6 U/L (ref 5–40)
ANION GAP SERPL CALC-SCNC: 16 MMOL/L
AST SERPL W P-5'-P-CCNC: 17 U/L (ref 10–37)
BASOPHILS # BLD: 0 K/UL (ref 0–0.2)
BASOPHILS NFR BLD: 0 % (ref 0–2)
BILIRUB SERPL-MCNC: 0.3 MG/DL (ref 0.2–1.2)
BILIRUB UR QL: NEGATIVE
BUN SERPL-MCNC: 17 MG/DL (ref 6–22)
CALCIUM SERPL-MCNC: 9 MG/DL (ref 8.4–10.5)
CHLORIDE SERPL-SCNC: 102 MMOL/L (ref 98–110)
CHOLEST SERPL-MCNC: 206 MG/DL (ref 110–200)
CO2 SERPL-SCNC: 23 MMOL/L (ref 20–32)
CREAT SERPL-MCNC: 0.6 MG/DL (ref 0.5–1.2)
EOSINOPHIL # BLD: 0.2 K/UL (ref 0–0.5)
EOSINOPHIL NFR BLD: 3 % (ref 0–6)
EPITHELIAL,EPSU: ABNORMAL /HPF (ref 0–2)
ERYTHROCYTE [DISTWIDTH] IN BLOOD BY AUTOMATED COUNT: 14.5 % (ref 10–15.5)
GFRAA, 66117: >60
GFRNA, 66118: >60
GLOBULIN,GLOB: 2.5 G/DL (ref 2–4)
GLUCOSE SERPL-MCNC: 76 MG/DL (ref 70–99)
GLUCOSE UR QL: NEGATIVE MG/DL
GRANULOCYTES,GRANS: 71 % (ref 40–75)
HCT VFR BLD AUTO: 37.9 % (ref 35.1–46.5)
HDLC SERPL-MCNC: 3.3 MG/DL (ref 0–5)
HDLC SERPL-MCNC: 63 MG/DL (ref 40–59)
HGB BLD-MCNC: 11.5 G/DL (ref 11.7–15.5)
HGB UR QL STRIP: ABNORMAL
KETONES UR QL STRIP.AUTO: NEGATIVE MG/DL
LDLC SERPL CALC-MCNC: 129 MG/DL (ref 50–99)
LEUKOCYTE ESTERASE: NEGATIVE
LYMPHOCYTES, LYMLT: 22 % (ref 20–45)
MCH RBC QN AUTO: 27 PG (ref 26–34)
MCHC RBC AUTO-ENTMCNC: 30 G/DL (ref 31–36)
MCV RBC AUTO: 91 FL (ref 80–95)
MONOCYTES # BLD: 0.2 K/UL (ref 0.1–1)
MONOCYTES NFR BLD: 3 % (ref 3–12)
NEUTROPHILS # BLD AUTO: 5 K/UL (ref 1.8–7.7)
NITRITE UR QL STRIP.AUTO: NEGATIVE
PH UR STRIP: 6 PH (ref 5–8)
PLATELET # BLD AUTO: 287 K/UL (ref 140–440)
PMV BLD AUTO: 11 FL (ref 9–13)
POTASSIUM SERPL-SCNC: 4.4 MMOL/L (ref 3.5–5.5)
PROT SERPL-MCNC: 7.1 G/DL (ref 6.4–8.3)
PROT UR QL STRIP: NEGATIVE MG/DL
RBC # BLD AUTO: 4.19 M/UL (ref 3.8–5.2)
RBC #/AREA URNS HPF: ABNORMAL /HPF
SODIUM SERPL-SCNC: 141 MMOL/L (ref 133–145)
SP GR UR: 1.02 (ref 1–1.03)
TRIGL SERPL-MCNC: 69 MG/DL (ref 40–149)
TSH SERPL DL<=0.005 MIU/L-ACNC: 0.59 MCU/ML (ref 0.27–4.2)
UROBILINOGEN UR STRIP-MCNC: <2 MG/DL
VLDLC SERPL CALC-MCNC: 14 MG/DL (ref 8–30)
WBC # BLD AUTO: 7 K/UL (ref 4–11)
WBC URNS QL MICRO: ABNORMAL /HPF (ref 0–2)

## 2019-01-17 ENCOUNTER — OFFICE VISIT (OUTPATIENT)
Dept: FAMILY MEDICINE CLINIC | Age: 39
End: 2019-01-17

## 2019-01-17 VITALS
RESPIRATION RATE: 18 BRPM | DIASTOLIC BLOOD PRESSURE: 75 MMHG | WEIGHT: 139.4 LBS | OXYGEN SATURATION: 97 % | HEIGHT: 67 IN | SYSTOLIC BLOOD PRESSURE: 112 MMHG | BODY MASS INDEX: 21.88 KG/M2 | TEMPERATURE: 97.4 F | HEART RATE: 89 BPM

## 2019-01-17 DIAGNOSIS — F41.9 ANXIETY: ICD-10-CM

## 2019-01-17 DIAGNOSIS — Z00.00 ROUTINE GENERAL MEDICAL EXAMINATION AT A HEALTH CARE FACILITY: Primary | ICD-10-CM

## 2019-01-17 RX ORDER — ALPRAZOLAM 0.5 MG/1
0.5 TABLET ORAL
Qty: 30 TAB | Refills: 0 | Status: SHIPPED | OUTPATIENT
Start: 2019-01-17 | End: 2021-07-30

## 2019-01-17 NOTE — PROGRESS NOTES
Room #      SUBJECTIVE:    Min Sanders is a 41 y.o. female who presents today for complete physical     1. Have you been to the ER, urgent care clinic since your last visit? Hospitalized since your last visit? NO    2. Have you seen or consulted any other health care providers outside of the [address] [place] [address] since your last visit? Include any pap smears or colon screening. NO  When :  Reason:    Health Maintenance reviewed Yes    Health Maintenance Due   Topic Date Due    MEDICARE YEARLY EXAM  01/16/2019

## 2019-01-17 NOTE — PROGRESS NOTES
Lorrie Henao is a 41 y.o.  female and presents for a preventive health care visit   Subjective:  Health Maintenance History  Immunizations reviewed, utd  indicated. Mammogram : na , dexascan: na ,pap smear : na ,   colonoscopy: utd , Eye exam: na ,  Chest CT: na ,    HPI ; There are no active problems to display for this patient. Current Outpatient Medications   Medication Sig Dispense Refill    ALPRAZolam (XANAX) 0.5 mg tablet Take 1 Tab by mouth two (2) times daily as needed for Anxiety. Max Daily Amount: 1 mg. 30 Tab 0     No Known Allergies  Past Medical History:   Diagnosis Date    Acne     Anemia NEC     Calculus of kidney     Depression     anxiety    HPV in female     HSV infection     Hypoglycemia     PID (pelvic inflammatory disease)      Past Surgical History:   Procedure Laterality Date    COSMETIC RATE ADJ  03/02/11    abdominal plastic    HX LIPOSUCTION  3/15/13    HX OTHER SURGICAL  05/04/12    Calf implant    HX TUBAL LIGATION  9/18/12     Family History   Problem Relation Age of Onset    Hypertension Maternal Grandmother     Hypertension Maternal Grandfather      Social History     Tobacco Use    Smoking status: Never Smoker    Smokeless tobacco: Never Used   Substance Use Topics    Alcohol use: Yes     Comment: Occassionally.         ROS       General: negative for - chills, fatigue, fever, weight change  Psych: negative for - anxiety, depression, irritability or mood swings  ENT: negative for - headaches, hearing change, nasal congestion, oral lesions, sneezing or sore throat  Heme/ Lymph: negative for - bleeding problems, bruising, pallor or swollen lymph nodes  Endo: negative for - hot flashes, polydipsia/polyuria or temperature intolerance  Resp: negative for - cough, shortness of breath or wheezing  CV: negative for - chest pain, edema or palpitations  GI: negative for - abdominal pain, change in bowel habits, constipation, diarrhea or nausea/vomiting  : negative for - dysuria, hematuria, incontinence, pelvic pain or vulvar/vaginal symptoms  MSK: negative for - joint pain, joint swelling or muscle pain  Neuro: negative for - confusion, headaches, seizures or weakness  Derm: negative for - dry skin, hair changes, rash or skin lesion changes      Objective:  Vitals:    01/17/19 1259   BP: 112/75   Pulse: 89   Resp: 18   Temp: 97.4 °F (36.3 °C)   TempSrc: Oral   SpO2: 97%   Weight: 139 lb 6.4 oz (63.2 kg)   Height: 5' 7\" (1.702 m)   PainSc:   0 - No pain   LMP: 01/07/2019       alert, well appearing, and in no distress, oriented to person, place, and time and normal appearing weight  Mental status - alert, oriented to person, place, and time  Eyes - pupils equal and reactive, extraocular eye movements intact  Ears - bilateral TM's and external ear canals normal  Nose - normal and patent, no erythema, discharge or polyps  Mouth - mucous membranes moist, pharynx normal without lesions  Neck - supple, no significant adenopathy  Lymphatics - no palpable lymphadenopathy, no hepatosplenomegaly  Chest - clear to auscultation, no wheezes, rales or rhonchi, symmetric air entry  Heart - normal rate, regular rhythm, normal S1, S2, no murmurs, rubs, clicks or gallops  Abdomen - soft, nontender, nondistended, no masses or organomegaly  Back exam - full range of motion, no tenderness, palpable spasm or pain on motion  Neurological - alert, oriented, normal speech, no focal findings or movement disorder noted  Musculoskeletal - no joint tenderness, deformity or swelling  Extremities - peripheral pulses normal, no pedal edema, no clubbing or cyanosis  Skin - normal coloration and turgor, no rashes, no suspicious skin lesions noted        LABS  Component      Latest Ref Rng & Units 1/9/2019 1/9/2019 1/9/2019           2:01 PM  2:01 PM  2:01 PM   WBC      4.0 - 11.0 K/uL      RBC      3.80 - 5.20 M/uL      HGB      11.7 - 15.5 g/dL      HCT      35.1 - 46.5 % MCV      80 - 95 fL      MCH      26 - 34 pg      MCHC      31 - 36 g/dL      RDW      10.0 - 15.5 %      PLATELET      557 - 774 K/uL      MPV      9.0 - 13.0 fL      NEUTROPHILS      40 - 75 %      Lymphocytes      20 - 45 %      MONOCYTES      3 - 12 %      EOSINOPHILS      0 - 6 %      BASOPHILS      0 - 2 %      ABS. NEUTROPHILS      1.8 - 7.7 K/uL      ABSOLUTE LYMPHOCYTE COUNT      1.0 - 4.8 K/uL      ABS. MONOCYTES      0.1 - 1.0 K/uL      ABS. EOSINOPHILS      0.0 - 0.5 K/uL      ABS. BASOPHILS      0.0 - 0.2 K/uL      Glucose      70 - 99 mg/dL 76     BUN      6 - 22 mg/dL 17     Creatinine      0.5 - 1.2 mg/dL 0.6     Sodium      133 - 145 mmol/L 141     Potassium      3.5 - 5.5 mmol/L 4.4     Chloride      98 - 110 mmol/L 102     CO2      20 - 32 mmol/L 23     AST      10 - 37 U/L 17     ALT (SGPT)      5 - 40 U/L 6     Alk.  phosphatase      25 - 115 U/L 57     Bilirubin, total      0.2 - 1.2 mg/dL 0.3     Calcium      8.4 - 10.5 mg/dL 9.0     Protein, total      6.4 - 8.3 g/dL 7.1     Albumin      3.5 - 5.0 g/dL 4.6     A-G Ratio      1.1 - 2.6 ratio 1.8     Globulin      2.0 - 4.0 g/dL 2.5     Anion gap      mmol/L 16.0     GFRAA      >60.0 >60.0     GFRNA      >60.0 >60.0     Specific Gravity      1.005 - 1.03      pH (UA)      5.0 - 8.0 pH      Protein      Negative, mg/dL      Glucose      Negative mg/dL      Ketone      Negative mg/dL      Bilirubin      Negative      Blood      Negative      Nitrites      Negative      Leukocyte Esterase      Negative      Urobilinogen      <2.0 mg/dL      WBC      0 - 2 /hpf      RBC      Negative, /hpf      Epithelial cells      0 - 2 /hpf      Triglyceride      40 - 149 mg/dL  69    HDL Cholesterol      40 - 59 mg/dL  63 (H)    Cholesterol, total      110 - 200 mg/dL  206 (H)    CHOLESTEROL/HDL      0.0 - 5.0  3.3    LDL, calculated      50 - 99 mg/dL  129 (H)    VLDL, calculated      8 - 30 mg/dL  14    TSH      0.27 - 4.20 mcU/mL   0.59     Component Latest Ref Rng & Units 1/9/2019 1/9/2019           2:01 PM  2:01 PM   WBC      4.0 - 11.0 K/uL  7.0   RBC      3.80 - 5.20 M/uL  4.19   HGB      11.7 - 15.5 g/dL  11.5 (L)   HCT      35.1 - 46.5 %  37.9   MCV      80 - 95 fL  91   MCH      26 - 34 pg  27   MCHC      31 - 36 g/dL  30 (L)   RDW      10.0 - 15.5 %  14.5   PLATELET      615 - 640 K/uL  287   MPV      9.0 - 13.0 fL  11.0   NEUTROPHILS      40 - 75 %  71   Lymphocytes      20 - 45 %  22   MONOCYTES      3 - 12 %  3   EOSINOPHILS      0 - 6 %  3   BASOPHILS      0 - 2 %  0   ABS. NEUTROPHILS      1.8 - 7.7 K/uL  5.0   ABSOLUTE LYMPHOCYTE COUNT      1.0 - 4.8 K/uL  1.5   ABS. MONOCYTES      0.1 - 1.0 K/uL  0.2   ABS. EOSINOPHILS      0.0 - 0.5 K/uL  0.2   ABS. BASOPHILS      0.0 - 0.2 K/uL  0.0   Glucose      70 - 99 mg/dL     BUN      6 - 22 mg/dL     Creatinine      0.5 - 1.2 mg/dL     Sodium      133 - 145 mmol/L     Potassium      3.5 - 5.5 mmol/L     Chloride      98 - 110 mmol/L     CO2      20 - 32 mmol/L     AST      10 - 37 U/L     ALT (SGPT)      5 - 40 U/L     Alk.  phosphatase      25 - 115 U/L     Bilirubin, total      0.2 - 1.2 mg/dL     Calcium      8.4 - 10.5 mg/dL     Protein, total      6.4 - 8.3 g/dL     Albumin      3.5 - 5.0 g/dL     A-G Ratio      1.1 - 2.6 ratio     Globulin      2.0 - 4.0 g/dL     Anion gap      mmol/L     GFRAA      >60.0     GFRNA      >60.0     Specific Gravity      1.005 - 1.03 1.024    pH (UA)      5.0 - 8.0 pH 6.0    Protein      Negative, mg/dL Negative    Glucose      Negative mg/dL Negative    Ketone      Negative mg/dL Negative    Bilirubin      Negative Negative    Blood      Negative Moderate (A)    Nitrites      Negative Negative    Leukocyte Esterase      Negative Negative    Urobilinogen      <2.0 mg/dL <2.0    WBC      0 - 2 /hpf 0-2    RBC      Negative, /hpf 101-150 (A)    Epithelial cells      0 - 2 /hpf 0-2    Triglyceride      40 - 149 mg/dL     HDL Cholesterol      40 - 59 mg/dL     Cholesterol, total      110 - 200 mg/dL     CHOLESTEROL/HDL      0.0 - 5.0     LDL, calculated      50 - 99 mg/dL     VLDL, calculated      8 - 30 mg/dL     TSH      0.27 - 4.20 mcU/mL       TESTS      Assessment/Plan:    Health Maintenance up to date. Recommend f/u physical 1 year. Routine screening labs/tests recommended prior to next physical.              I have discussed the diagnosis with the patient and the intended plan as seen in the above orders. The patient has received an after-visit summary and questions were answered concerning future plans. I have discussed medication side effects and warnings with the patient as well. I have reviewed the plan of care with the patient, accepted their input and they are in agreement with the treatment goals.          Follow-up Disposition: Not on File

## 2019-09-27 ENCOUNTER — HOSPITAL ENCOUNTER (EMERGENCY)
Age: 39
Discharge: HOME | End: 2019-09-27
Attending: EMERGENCY MEDICINE
Payer: COMMERCIAL

## 2019-09-27 VITALS
TEMPERATURE: 98.7 F | HEIGHT: 67 IN | WEIGHT: 140 LBS | SYSTOLIC BLOOD PRESSURE: 131 MMHG | OXYGEN SATURATION: 100 % | BODY MASS INDEX: 21.97 KG/M2 | DIASTOLIC BLOOD PRESSURE: 90 MMHG | RESPIRATION RATE: 18 BRPM | HEART RATE: 80 BPM

## 2019-09-27 DIAGNOSIS — M79.602 LEFT ARM PAIN: Primary | ICD-10-CM

## 2019-09-27 PROCEDURE — 99282 EMERGENCY DEPT VISIT SF MDM: CPT

## 2019-09-27 RX ORDER — GABAPENTIN 600 MG/1
TABLET ORAL
Qty: 20 TAB | Refills: 0 | Status: SHIPPED | OUTPATIENT
Start: 2019-09-27 | End: 2021-07-30

## 2019-09-27 RX ORDER — NAPROXEN 250 MG/1
TABLET ORAL
COMMUNITY
End: 2020-07-26

## 2019-09-27 NOTE — ED TRIAGE NOTES
Pt with c/o numbness and tingling left arm and hand for the past  year. Pt denies injury. States that Dr Raysa Gonzales states it was chronic stress and anxiety. States it's getting worse. 10/10.  Pt states also \"Zaps of pain in left lower back also\"

## 2019-09-27 NOTE — ED PROVIDER NOTES
[address] [facility] EMERGENCY DEPT    Date: 9/27/2019  Patient Name: Baron Jose    History of Presenting Illness     Chief Complaint   Patient presents with    Arm Pain       41 y.o. female with noted past medical history presents the ED complaining of left neck pain radiating into her left arm for the past year. She describes it as a sharp shooting pain and tingling at times, states that it is been constant for the past year. Notes that her PCP thought it was anxiety and wrote her for Xanax, which she says does not help it. She denies any fever, chills, known injury, other symptoms at this time. Patient denies any other associated signs or symptoms. Patient denies any other complaints. Nursing notes regarding the HPI and triage nursing notes were reviewed. Prior medical records were reviewed. Current Outpatient Medications   Medication Sig Dispense Refill    naproxen (NAPROSYN) 250 mg tablet Take  by mouth two (2) times daily as needed.  gabapentin (NEURONTIN) 600 mg tablet 300 mg p.o. daily x1 day, then 300 mg twice daily x1 day, then 300 mg p.o. 3 times daily. 20 Tab 0    ALPRAZolam (XANAX) 0.5 mg tablet Take 1 Tab by mouth two (2) times daily as needed for Anxiety.  Max Daily Amount: 1 mg. 30 Tab 0       Past History     Past Medical History:  Past Medical History:   Diagnosis Date    Acne     Anemia NEC     Calculus of kidney     Depression     anxiety    HPV in female     HSV infection     Hypoglycemia     PID (pelvic inflammatory disease)     Recurrent UTI     CARTER (stress urinary incontinence, female)     Urine leukocytes        Past Surgical History:  Past Surgical History:   Procedure Laterality Date    COSMETIC RATE ADJ  03/02/11    abdominal plastic    HX LIPOSUCTION  3/15/13    HX OTHER SURGICAL  05/04/12    Calf implant    HX TUBAL LIGATION  9/18/12       Family History:  Family History   Problem Relation Age of Onset    Hypertension Maternal Grandmother     Hypertension Maternal Grandfather        Social History:  Social History     Tobacco Use    Smoking status: Never Smoker    Smokeless tobacco: Never Used   Substance Use Topics    Alcohol use: Yes     Comment: Occassionally.  Drug use: No       Allergies:  No Known Allergies    Patient's primary care provider (as noted in EPIC):  None    Review of Systems   Constitutional:  Denies malaise, fever, chills. Head:  Denies injury. Neck:  + left sided neck pain. Cardiac:  Denies chest pain or palpitations. Respiratory:  Denies cough, wheezing, difficulty breathing, shortness of breath. GI/ABD:  Denies injury, pain, distention, nausea, vomiting, diarrhea. :  Denies injury, pain, dysuria or urgency. Back:  Denies injury or pain. Pelvis:  Denies injury or pain. Extremity/MS:  + left arm pain. Neuro:  + tingling in left arm. Denies weakness. Skin: Denies injury, rash, itching or skin changes. All other systems negative as reviewed. Visit Vitals  /90 (BP 1 Location: Right arm, BP Patient Position: At rest;Sitting)   Pulse 80   Temp 98.7 °F (37.1 °C)   Resp 18   Ht 5' 7\" (1.702 m)   Wt 63.5 kg (140 lb)   SpO2 100%   BMI 21.93 kg/m²       PHYSICAL EXAM:    CONSTITUTIONAL:  Alert, in no apparent distress;  well developed;  well nourished. HEAD:  Normocephalic, atraumatic. EYES:  EOMI. Non-icteric sclera. Normal conjunctiva. ENTM:  Nose:  no rhinorrhea. Throat:  no erythema or exudate, mucous membranes moist.  NECK:  Left lateral neck and adjacent medial trapezius focal mild reproducible tenderness to palpation. No rash, lesions, bruising. Supple. RESPIRATORY:  Chest clear, equal breath sounds, good air movement. CARDIOVASCULAR:  Regular rate and rhythm. No murmurs, rubs, or gallops. GI:  Normal bowel sounds, abdomen soft and non-tender. No rebound or guarding.   BACK:  Grossly normal exam.   UPPER EXT: Normal inspection, left superior arm with reproducible tenderness to palpation, neurovascularly intact distally with 5 out of 5 strength throughout. LOWER EXT:  No edema, no calf tenderness. Distal pulses intact. NEURO:  Moves all four extremities, and grossly normal motor exam.  SKIN:  No rashes;  Normal for age. PSYCH:  Alert and normal affect. DIFFERENTIAL DIAGNOSES/ MEDICAL DECISION MAKING:  Neck myofascial strain, spasm, neuropathy pain to include nerve impingement, cervical spine pathology such as spinal stenosis and/or a combination of the aforementioned. ED COURSE:      DDX: Strain, sprain, radicular pain, other etiologies. IMPRESSION AND MEDICAL DECISION MAKING:  Based upon the patients presentation with noted HPI and PE, along with the work up done in the emergency department, I believe that the patient is likely having cervical radiculopathy. Patient was placed on gabapentin by me, instructions to follow-up with orthopedic. Diagnosis:   1. Left arm pain      Disposition: Discharge    Follow-up Information     Follow up With Specialties Details Why [address] [facility], [address]  In 3 days  [address] EMERGENCY DEPT Emergency Medicine  If symptoms worsen [address]  [phone]          Discharge Medication List as of 9/27/2019  1:13 PM      START taking these medications    Details   gabapentin (NEURONTIN) 600 mg tablet 300 mg p.o. daily x1 day, then 300 mg twice daily x1 day, then 300 mg p.o. 3 times daily. , Print, Disp-20 Tab, R-0         CONTINUE these medications which have NOT CHANGED    Details   naproxen (NAPROSYN) 250 mg tablet Take  by mouth two (2) times daily as needed., Historical Med      ALPRAZolam (XANAX) 0.5 mg tablet Take 1 Tab by mouth two (2) times daily as needed for Anxiety.  Max Daily Amount: 1 mg., Print, Disp-30 Tab, R-0           ANT Hdz

## 2019-11-14 ENCOUNTER — HOSPITAL ENCOUNTER (OUTPATIENT)
Dept: MRI IMAGING | Age: 39
Discharge: HOME | End: 2019-11-14
Attending: ORTHOPAEDIC SURGERY
Payer: COMMERCIAL

## 2019-11-14 DIAGNOSIS — M54.12 BRACHIAL NEURITIS: ICD-10-CM

## 2019-11-14 PROCEDURE — 72141 MRI NECK SPINE W/O DYE: CPT

## 2020-07-26 ENCOUNTER — HOSPITAL ENCOUNTER (EMERGENCY)
Age: 40
Discharge: HOME | End: 2020-07-26
Attending: EMERGENCY MEDICINE
Payer: COMMERCIAL

## 2020-07-26 VITALS
RESPIRATION RATE: 16 BRPM | WEIGHT: 145 LBS | SYSTOLIC BLOOD PRESSURE: 114 MMHG | DIASTOLIC BLOOD PRESSURE: 77 MMHG | TEMPERATURE: 97 F | OXYGEN SATURATION: 100 % | BODY MASS INDEX: 22.76 KG/M2 | HEART RATE: 98 BPM | HEIGHT: 67 IN

## 2020-07-26 DIAGNOSIS — S16.1XXA STRAIN OF NECK MUSCLE, INITIAL ENCOUNTER: Primary | ICD-10-CM

## 2020-07-26 DIAGNOSIS — S46.812A STRAIN OF LEFT TRAPEZIUS MUSCLE, INITIAL ENCOUNTER: ICD-10-CM

## 2020-07-26 PROCEDURE — 99282 EMERGENCY DEPT VISIT SF MDM: CPT

## 2020-07-26 RX ORDER — LIDOCAINE 50 MG/G
PATCH TOPICAL
Qty: 30 EACH | Refills: 0 | Status: SHIPPED | OUTPATIENT
Start: 2020-07-26 | End: 2021-02-15 | Stop reason: SDUPTHER

## 2020-07-26 RX ORDER — CYCLOBENZAPRINE HCL 10 MG
10 TABLET ORAL
Qty: 15 TAB | Refills: 0 | Status: SHIPPED | OUTPATIENT
Start: 2020-07-26 | End: 2021-02-15 | Stop reason: SDUPTHER

## 2020-07-26 RX ORDER — ACETAMINOPHEN 325 MG/1
1000 TABLET ORAL
Qty: 20 TAB | Refills: 0 | Status: SHIPPED | OUTPATIENT
Start: 2020-07-26 | End: 2022-10-10 | Stop reason: ALTCHOICE

## 2020-07-26 RX ORDER — PREDNISONE 10 MG/1
TABLET ORAL
COMMUNITY
End: 2021-07-30

## 2020-07-26 RX ORDER — NAPROXEN 500 MG/1
500 TABLET ORAL 2 TIMES DAILY WITH MEALS
Qty: 20 TAB | Refills: 0 | Status: SHIPPED | OUTPATIENT
Start: 2020-07-26 | End: 2020-08-05

## 2020-07-26 NOTE — LETTER
[facility] EMERGENCY DEPT 
[address] 
[address] [facility] [address] [phone] Work/School Note Date: 7/26/2020 To Whom It May concern: 
 
Jack Leary was seen and treated today in the emergency room by the following provider(s): 
Attending Provider: Clara Aleman MD 
Physician Assistant: Kathy Dawson PA-C. Jack Leary may return to work on 7/28/20. Sincerely, Ata Gillette PA-C

## 2020-07-26 NOTE — ED PROVIDER NOTES
EMERGENCY DEPARTMENT HISTORY AND PHYSICAL EXAM    1:30 PM      Date: 7/26/2020  Patient Name: Ruthie Ballard    History of Presenting Illness     Chief Complaint   Patient presents with    Neck Pain    Shoulder Pain         History Provided By: Patient    Additional History (Context): Ruthie Ballard is a 46 y.o. female with PMHx as noted below who presents with complaints of neck stiffness that has been present x 5 days. Patient states she feels as if she slept wrong on her neck and shoulder and has been having stiffness since then. Patient denies any trauma or injury. She denies any midline neck pain. Denies any fevers or chilss. PCP: None    Current Outpatient Medications   Medication Sig Dispense Refill    predniSONE (DELTASONE) 10 mg tablet Take  by mouth daily (with breakfast).  cyclobenzaprine (FLEXERIL) 10 mg tablet Take 1 Tab by mouth three (3) times daily as needed for Muscle Spasm(s). 15 Tab 0    naproxen (Naprosyn) 500 mg tablet Take 1 Tab by mouth two (2) times daily (with meals) for 10 days. 20 Tab 0    acetaminophen (TYLENOL) 325 mg tablet Take 3 Tabs by mouth three (3) times daily as needed for Pain. 20 Tab 0    lidocaine (Lidoderm) 5 % Apply patch to the affected area for 12 hours a day and remove for 12 hours a day. 30 Each 0    gabapentin (NEURONTIN) 600 mg tablet 300 mg p.o. daily x1 day, then 300 mg twice daily x1 day, then 300 mg p.o. 3 times daily. 20 Tab 0    ALPRAZolam (XANAX) 0.5 mg tablet Take 1 Tab by mouth two (2) times daily as needed for Anxiety.  Max Daily Amount: 1 mg. 30 Tab 0       Past History     Past Medical History:  Past Medical History:   Diagnosis Date    Acne     Anemia NEC     Calculus of kidney     Depression     anxiety    HPV in female     HSV infection     Hypoglycemia     PID (pelvic inflammatory disease)     Recurrent UTI     CARTER (stress urinary incontinence, female)     Urine leukocytes        Past Surgical History:  Past Surgical History:   Procedure Laterality Date    COSMETIC RATE ADJ  03/02/11    abdominal plastic    HX LIPOSUCTION  3/15/13    HX OTHER SURGICAL  05/04/12    Calf implant    HX TUBAL LIGATION  9/18/12       Family History:  Family History   Problem Relation Age of Onset    Hypertension Maternal Grandmother     Hypertension Maternal Grandfather        Social History:  Social History     Tobacco Use    Smoking status: Never Smoker    Smokeless tobacco: Never Used   Substance Use Topics    Alcohol use: Yes     Comment: Occassionally.  Drug use: No       Allergies:  No Known Allergies      Review of Systems       Review of Systems   Constitutional: Negative. HENT: Negative. Respiratory: Negative. Cardiovascular: Negative. Gastrointestinal: Negative. Genitourinary: Negative. Musculoskeletal: Positive for neck pain and neck stiffness. Skin: Negative. Neurological: Negative. All other systems reviewed and are negative. Physical Exam     Visit Vitals  /77 (BP 1 Location: Right arm, BP Patient Position: At rest)   Pulse 98   Temp 97 °F (36.1 °C)   Resp 16   Ht 5' 7\" (1.702 m)   Wt 65.8 kg (145 lb)   LMP 07/18/2020   SpO2 100%   BMI 22.71 kg/m²         Physical Exam  Vitals signs reviewed. Constitutional:       General: She is not in acute distress. Appearance: Normal appearance. She is not ill-appearing, toxic-appearing or diaphoretic. HENT:      Head: Normocephalic and atraumatic. Right Ear: External ear normal.      Left Ear: External ear normal.      Nose: Nose normal.      Mouth/Throat:      Mouth: Mucous membranes are moist.      Pharynx: Oropharynx is clear. Eyes:      Extraocular Movements: Extraocular movements intact. Neck:      Musculoskeletal: Full passive range of motion without pain, normal range of motion and neck supple. Muscular tenderness present.  No edema, neck rigidity, crepitus, injury, pain with movement, torticollis or spinous process tenderness. Meningeal: Brudzinski's sign and Kernig's sign absent. Comments: Tenderness of left cervical paraspinal muscles to palpation. Tenderness of left trapezius muscle to palpation. No midline, bony, or point tenderness on exam.  Cardiovascular:      Rate and Rhythm: Normal rate and regular rhythm. Pulses: Normal pulses. Heart sounds: Normal heart sounds. No murmur. No gallop. Pulmonary:      Effort: Pulmonary effort is normal.      Breath sounds: Normal breath sounds. Musculoskeletal: Normal range of motion. General: No swelling, tenderness, deformity or signs of injury. Comments: Full ROM left shoulder. Skin:     General: Skin is warm and dry. Capillary Refill: Capillary refill takes less than 2 seconds. Neurological:      General: No focal deficit present. Mental Status: She is alert and oriented to person, place, and time. Cranial Nerves: No cranial nerve deficit. Diagnostic Study Results     Labs -  No results found for this or any previous visit (from the past 12 hour(s)). Radiologic Studies -   No orders to display         Medical Decision Making   I am the first provider for this patient. I reviewed the vital signs, available nursing notes, past medical history, past surgical history, family history and social history. Vital Signs-Reviewed the patient's vital signs. Records Reviewed: Nursing Notes and Old Medical Records (Time of Review: 1:30 PM)    ED Course: Progress Notes, Reevaluation, and Consults:  1:30 PM met with patient, reviewed history, performed physical exam.  Physical exam reveals tenderness palpation of the left cervical paraspinal muscles as well as tenderness palpation of left trapezius. Do not feel imaging would be of high yield in this patient. Will treat symptomatically for cervical strain and trapezius strain.     Provider Notes (Medical Decision Making):   67-year-old female seen in the emergency department complaints of neck pain and stiffness. Physical exam shows tenderness palpation at the  paraspinal cervical muscle as well as left trapezius. There is no midline, bony, or point tenderness. No crepitus or step-off. Do not feel imaging would be of high yield in this patient. Will treat symptomatically. Patient advised to follow-up with her primary care provider for reassessment. Patient advised to return to the ED for worsening symptoms, or as needed. Diagnosis     Clinical Impression:   1. Strain of neck muscle, initial encounter    2. Strain of left trapezius muscle, initial encounter        Disposition: home     Follow-up Information     Follow up With Specialties Details Why [id number]  Call in 1 day For follow up regarding ER visit. [address] [facility] [phone]    [address] [facility] EMERGENCY DEPT Emergency Medicine  Immediately if symptoms worsen, As needed. [address]  [phone]           Patient's Medications   Start Taking    ACETAMINOPHEN (TYLENOL) 325 MG TABLET    Take 3 Tabs by mouth three (3) times daily as needed for Pain. CYCLOBENZAPRINE (FLEXERIL) 10 MG TABLET    Take 1 Tab by mouth three (3) times daily as needed for Muscle Spasm(s). LIDOCAINE (LIDODERM) 5 %    Apply patch to the affected area for 12 hours a day and remove for 12 hours a day. NAPROXEN (NAPROSYN) 500 MG TABLET    Take 1 Tab by mouth two (2) times daily (with meals) for 10 days. Continue Taking    ALPRAZOLAM (XANAX) 0.5 MG TABLET    Take 1 Tab by mouth two (2) times daily as needed for Anxiety. Max Daily Amount: 1 mg. GABAPENTIN (NEURONTIN) 600 MG TABLET    300 mg p.o. daily x1 day, then 300 mg twice daily x1 day, then 300 mg p.o. 3 times daily. PREDNISONE (DELTASONE) 10 MG TABLET    Take  by mouth daily (with breakfast).    These Medications have changed    No medications on file   Stop Taking    NAPROXEN (NAPROSYN) 250 MG TABLET    Take  by mouth two (2) times daily as needed. PHENAZOPYRIDINE (PYRIDIUM) 100 MG TABLET    take 1 tablet by mouth four times a day if needed for pain     Vadim Cody PA-C    Dictation disclaimer:  Please note that this dictation was completed with Tivity, the computer voice recognition software. Quite often unanticipated grammatical, syntax, homophones, and other interpretive errors are inadvertently transcribed by the computer software. Please disregard these errors. Please excuse any errors that have escaped final proofreading.

## 2020-07-26 NOTE — ED NOTES
I have reviewed discharge instructions with the patient. The patient verbalized understanding. Patient armband removed and given to patient to take home. Patient was informed of the privacy risks if armband lost or stolen    The patient Jacobo Wheeler is a 46 y.o. female who  has a past medical history of Acne, Anemia NEC, Calculus of kidney, Depression, HPV in female, HSV infection, Hypoglycemia, PID (pelvic inflammatory disease), Recurrent UTI, CARTER (stress urinary incontinence, female), and Urine leukocytes. .  She   The patient's medications were reviewed and discussed prior to discharge. The patient was very interactive and did understand their medications. The following medications were discussed in details with the patient. The patient said [facility]. [unfilled]    Theodore Arnold RN    MyChart Activation    Thank you for requesting access to [facility]. Please follow the instructions below to securely access and download your online medical record. [facility] allows you to send messages to your doctor, view your test results, renew your prescriptions, schedule appointments, and more. How Do I Sign Up? 1. In your internet browser, go to [email or web address]  2. Click on the First Time User? Click Here link in the Sign In box. You will be redirect to the New Member Sign Up page. 3. Enter your [facility] Access Code exactly as it appears below. You will not need to use this code after youve completed the sign-up process. If you do not sign up before the expiration date, you must request a new code. [facility] Access Code: [unfilled] (This is the date your [facility] access code will )    4. Enter the last four digits of your Social Security Number (xxxx) and Date of Birth (mm/dd/yyyy) as indicated and click Submit. You will be taken to the next sign-up page. 5. Create a [facility] ID.  This will be your [facility] login ID and cannot be changed, so think of one that is secure and easy to remember. 6. Create a [facility] password. You can change your password at any time. 7. Enter your Password Reset Question and Answer. This can be used at a later time if you forget your password. 8. Enter your e-mail address. You will receive e-mail notification when new information is available in [address] 9. Click Sign Up. You can now view and download portions of your medical record. 10. Click the Download Summary menu link to download a portable copy of your medical information. Additional Information    If you have questions, please visit the Frequently Asked Questions section of the [employer] website at [email or web address] Remember, [employer] is NOT to be used for urgent needs. For medical emergencies, dial 911.

## 2020-07-26 NOTE — DISCHARGE INSTRUCTIONS
Patient Education        Neck Strain: Care Instructions  Your Care Instructions     You have strained the muscles and ligaments in your neck. A sudden, awkward movement can strain the neck. This often occurs with falls or car accidents or during certain sports. Everyday activities like working on a computer or sleeping can also cause neck strain if they force you to hold your neck in an awkward position for a long time. It is common for neck pain to get worse for a day or two after an injury, but it should start to feel better after that. You may have more pain and stiffness for several days before it gets better. This is expected. It may take a few weeks or longer for it to heal completely. Good home treatment can help you get better faster and avoid future neck problems. Follow-up care is a key part of your treatment and safety. Be sure to make and go to all appointments, and call your doctor if you are having problems. It's also a good idea to know your test results and keep a list of the medicines you take. How can you care for yourself at home? · If you were given a neck brace (cervical collar) to limit neck motion, wear it as instructed for as many days as your doctor tells you to. Do not wear it longer than you were told to. Wearing a brace for too long can make neck stiffness worse and weaken the neck muscles. · You can try using heat or ice to see if it helps. ? Try using a heating pad on a low or medium setting for 15 to 20 minutes every 2 to 3 hours. Try a warm shower in place of one session with the heating pad. You can also buy single-use heat wraps that last up to 8 hours. ? You can also try an ice pack for 10 to 15 minutes every 2 to 3 hours. · Take pain medicines exactly as directed. ? If the doctor gave you a prescription medicine for pain, take it as prescribed. ? If you are not taking a prescription pain medicine, ask your doctor if you can take an over-the-counter medicine.   · Gently rub the area to relieve pain and help with blood flow. Do not massage the area if it hurts to do so. · Do not do anything that makes the pain worse. Take it easy for a couple of days. You can do your usual activities if they do not hurt your neck or put it at risk for more stress or injury. · Try sleeping on a special neck pillow. Place it under your neck, not under your head. Placing a tightly rolled-up towel under your neck while you sleep will also work. If you use a neck pillow or rolled towel, do not use your regular pillow at the same time. · To prevent future neck pain, do exercises to stretch and strengthen your neck and back. Learn how to use good posture, safe lifting techniques, and proper body mechanics. When should you call for help? [id number] anytime you think you may need emergency care. For example, call if:  · You are unable to move an arm or a leg at all. Call your doctor now or seek immediate medical care if:  · You have new or worse symptoms in your arms, legs, chest, belly, or buttocks. Symptoms may include:  ? Numbness or tingling. ? Weakness. ? Pain. · You lose bladder or bowel control. Watch closely for changes in your health, and be sure to contact your doctor if:  · You are not getting better as expected. Where can you learn more? Go to http://tanja-vinita.info/  Enter M253 in the search box to learn more about \"Neck Strain: Care Instructions. \"  Current as of: March 2, 2020               Content Version: 12.5  © 2006-2020 Healthwise, Incorporated. Care instructions adapted under license by [employer] (which disclaims liability or warranty for this information). If you have questions about a medical condition or this instruction, always ask your healthcare professional. [place] [address] any warranty or liability for your use of this information.          Patient Education        Neck Strain or Sprain: Rehab Exercises  Introduction  Here are some examples of exercises for you to try. The exercises may be suggested for a condition or for rehabilitation. Start each exercise slowly. Ease off the exercises if you start to have pain. You will be told when to start these exercises and which ones will work best for you. How to do the exercises  Neck rotation   1. Sit in a firm chair, or stand up straight. 2. Keeping your chin level, turn your head to the right, and hold for 15 to 30 seconds. 3. Turn your head to the left and hold for 15 to 30 seconds. 4. Repeat 2 to 4 times to each side. Neck stretches   1. Look straight ahead, and tip your right ear to your right shoulder. Do not let your left shoulder rise up as you tip your head to the right. 2. Hold for 15 to 30 seconds. 3. Tilt your head to the left. Do not let your right shoulder rise up as you tip your head to the left. 4. Hold for 15 to 30 seconds. 5. Repeat 2 to 4 times to each side. Forward neck flexion   1. Sit in a firm chair, or stand up straight. 2. Bend your head forward. 3. Hold for 15 to 30 seconds. 4. Repeat 2 to 4 times. Lateral (side) bend strengthening   1. With your right hand, place your first two fingers on your right temple. 2. Start to bend your head to the side while using gentle pressure from your fingers to keep your head from bending. 3. Hold for about 6 seconds. 4. Repeat 8 to 12 times. 5. Switch hands and repeat the same exercise on your left side. Forward bend strengthening   1. Place your first two fingers of either hand on your forehead. 2. Start to bend your head forward while using gentle pressure from your fingers to keep your head from bending. 3. Hold for about 6 seconds. 4. Repeat 8 to 12 times. Neutral position strengthening   1. Using one hand, place your fingertips on the back of your head at the top of your neck.   2. Start to bend your head backward while using gentle pressure from your fingers to keep your head from bending. 3. Hold for about 6 seconds. 4. Repeat 8 to 12 times. Chin tuck   1. Lie on the floor with a rolled-up towel under your neck. Your head should be touching the floor. 2. Slowly bring your chin toward your chest.  3. Hold for a count of 6, and then relax for up to 10 seconds. 4. Repeat 8 to 12 times. Follow-up care is a key part of your treatment and safety. Be sure to make and go to all appointments, and call your doctor if you are having problems. It's also a good idea to know your test results and keep a list of the medicines you take. Where can you learn more? Go to http://www.gray.com/  Enter M679 in the search box to learn more about \"Neck Strain or Sprain: Rehab Exercises. \"  Current as of: March 2, 2020               Content Version: 12.5  © 4460-8913 Healthwise, Incorporated. Care instructions adapted under license by [employer] (which disclaims liability or warranty for this information). If you have questions about a medical condition or this instruction, always ask your healthcare professional. [place] [address] any warranty or liability for your use of this information.

## 2021-02-11 ENCOUNTER — HOSPITAL ENCOUNTER (OUTPATIENT)
Dept: LAB | Age: 41
Discharge: HOME | End: 2021-02-11
Payer: COMMERCIAL

## 2021-02-11 ENCOUNTER — OFFICE VISIT (OUTPATIENT)
Dept: FAMILY MEDICINE CLINIC | Age: 41
End: 2021-02-11
Payer: COMMERCIAL

## 2021-02-11 VITALS
SYSTOLIC BLOOD PRESSURE: 122 MMHG | DIASTOLIC BLOOD PRESSURE: 81 MMHG | RESPIRATION RATE: 16 BRPM | WEIGHT: 150.4 LBS | HEART RATE: 73 BPM | BODY MASS INDEX: 23.61 KG/M2 | OXYGEN SATURATION: 100 % | TEMPERATURE: 97.5 F | HEIGHT: 67 IN

## 2021-02-11 DIAGNOSIS — D64.9 ANEMIA, UNSPECIFIED TYPE: ICD-10-CM

## 2021-02-11 DIAGNOSIS — Z23 ENCOUNTER FOR IMMUNIZATION: ICD-10-CM

## 2021-02-11 DIAGNOSIS — E78.00 ELEVATED LDL CHOLESTEROL LEVEL: ICD-10-CM

## 2021-02-11 DIAGNOSIS — E78.00 ELEVATED LDL CHOLESTEROL LEVEL: Primary | ICD-10-CM

## 2021-02-11 DIAGNOSIS — N39.0 RECURRENT UTI: ICD-10-CM

## 2021-02-11 LAB
ALBUMIN SERPL-MCNC: 4.1 G/DL (ref 3.4–5)
ALBUMIN/GLOB SERPL: 1.2 {RATIO} (ref 0.8–1.7)
ALP SERPL-CCNC: 59 U/L (ref 45–117)
ALT SERPL-CCNC: 15 U/L (ref 13–56)
ANION GAP SERPL CALC-SCNC: 4 MMOL/L (ref 3–18)
AST SERPL-CCNC: 10 U/L (ref 10–38)
BASOPHILS # BLD: 0 K/UL (ref 0–0.1)
BASOPHILS NFR BLD: 0 % (ref 0–2)
BILIRUB SERPL-MCNC: 0.4 MG/DL (ref 0.2–1)
BILIRUB UR QL STRIP: NEGATIVE
BUN SERPL-MCNC: 20 MG/DL (ref 7–18)
BUN/CREAT SERPL: 25 (ref 12–20)
CALCIUM SERPL-MCNC: 8.7 MG/DL (ref 8.5–10.1)
CHLORIDE SERPL-SCNC: 108 MMOL/L (ref 100–111)
CHOLEST SERPL-MCNC: 207 MG/DL
CO2 SERPL-SCNC: 27 MMOL/L (ref 21–32)
CREAT SERPL-MCNC: 0.8 MG/DL (ref 0.6–1.3)
DIFFERENTIAL METHOD BLD: ABNORMAL
EOSINOPHIL # BLD: 0.1 K/UL (ref 0–0.4)
EOSINOPHIL NFR BLD: 2 % (ref 0–5)
ERYTHROCYTE [DISTWIDTH] IN BLOOD BY AUTOMATED COUNT: 14.8 % (ref 11.6–14.5)
GLOBULIN SER CALC-MCNC: 3.3 G/DL (ref 2–4)
GLUCOSE SERPL-MCNC: 74 MG/DL (ref 74–99)
GLUCOSE UR-MCNC: NEGATIVE MG/DL
HCT VFR BLD AUTO: 38.9 % (ref 35–45)
HDLC SERPL-MCNC: 69 MG/DL (ref 40–60)
HDLC SERPL: 3 {RATIO} (ref 0–5)
HGB BLD-MCNC: 12.2 G/DL (ref 12–16)
KETONES P FAST UR STRIP-MCNC: NEGATIVE MG/DL
LDLC SERPL CALC-MCNC: 120 MG/DL (ref 0–100)
LIPID PROFILE,FLP: ABNORMAL
LYMPHOCYTES # BLD: 1.8 K/UL (ref 0.9–3.6)
LYMPHOCYTES NFR BLD: 26 % (ref 21–52)
MCH RBC QN AUTO: 27.7 PG (ref 24–34)
MCHC RBC AUTO-ENTMCNC: 31.4 G/DL (ref 31–37)
MCV RBC AUTO: 88.2 FL (ref 74–97)
MONOCYTES # BLD: 0.4 K/UL (ref 0.05–1.2)
MONOCYTES NFR BLD: 6 % (ref 3–10)
NEUTS SEG # BLD: 4.7 K/UL (ref 1.8–8)
NEUTS SEG NFR BLD: 66 % (ref 40–73)
PH UR STRIP: 7 [PH] (ref 4.6–8)
PLATELET # BLD AUTO: 309 K/UL (ref 135–420)
PMV BLD AUTO: 10.8 FL (ref 9.2–11.8)
POTASSIUM SERPL-SCNC: 4.2 MMOL/L (ref 3.5–5.5)
PROT SERPL-MCNC: 7.4 G/DL (ref 6.4–8.2)
PROT UR QL STRIP: NEGATIVE
RBC # BLD AUTO: 4.41 M/UL (ref 4.2–5.3)
SODIUM SERPL-SCNC: 139 MMOL/L (ref 136–145)
SP GR UR STRIP: 1.02 (ref 1–1.03)
TRIGL SERPL-MCNC: 90 MG/DL (ref ?–150)
TSH SERPL DL<=0.05 MIU/L-ACNC: 0.39 UIU/ML (ref 0.36–3.74)
UA UROBILINOGEN AMB POC: NORMAL (ref 0.2–1)
URINALYSIS CLARITY POC: CLEAR
URINALYSIS COLOR POC: YELLOW
URINE BLOOD POC: NEGATIVE
URINE LEUKOCYTES POC: NEGATIVE
URINE NITRITES POC: NEGATIVE
VLDLC SERPL CALC-MCNC: 18 MG/DL
WBC # BLD AUTO: 7 K/UL (ref 4.6–13.2)

## 2021-02-11 PROCEDURE — 90471 IMMUNIZATION ADMIN: CPT | Performed by: STUDENT IN AN ORGANIZED HEALTH CARE EDUCATION/TRAINING PROGRAM

## 2021-02-11 PROCEDURE — 80061 LIPID PANEL: CPT

## 2021-02-11 PROCEDURE — 85025 COMPLETE CBC W/AUTO DIFF WBC: CPT

## 2021-02-11 PROCEDURE — 84443 ASSAY THYROID STIM HORMONE: CPT

## 2021-02-11 PROCEDURE — 36415 COLL VENOUS BLD VENIPUNCTURE: CPT

## 2021-02-11 PROCEDURE — 81003 URINALYSIS AUTO W/O SCOPE: CPT | Performed by: STUDENT IN AN ORGANIZED HEALTH CARE EDUCATION/TRAINING PROGRAM

## 2021-02-11 PROCEDURE — 90686 IIV4 VACC NO PRSV 0.5 ML IM: CPT | Performed by: STUDENT IN AN ORGANIZED HEALTH CARE EDUCATION/TRAINING PROGRAM

## 2021-02-11 PROCEDURE — 99214 OFFICE O/P EST MOD 30 MIN: CPT | Performed by: STUDENT IN AN ORGANIZED HEALTH CARE EDUCATION/TRAINING PROGRAM

## 2021-02-11 PROCEDURE — 80053 COMPREHEN METABOLIC PANEL: CPT

## 2021-02-11 RX ORDER — PHENAZOPYRIDINE HYDROCHLORIDE 100 MG/1
TABLET, FILM COATED ORAL
Qty: 21 TAB | Refills: 0 | Status: SHIPPED | OUTPATIENT
Start: 2021-02-11 | End: 2021-07-30

## 2021-02-11 RX ORDER — LIDOCAINE 50 MG/G
PATCH TOPICAL
Qty: 30 EACH | Refills: 0 | Status: CANCELLED | OUTPATIENT
Start: 2021-02-11

## 2021-02-15 DIAGNOSIS — M62.838 MUSCLE SPASM: Primary | ICD-10-CM

## 2021-02-15 RX ORDER — CYCLOBENZAPRINE HCL 10 MG
10 TABLET ORAL
Qty: 15 TAB | Refills: 0 | Status: SHIPPED | OUTPATIENT
Start: 2021-02-15 | End: 2021-08-16

## 2021-02-15 RX ORDER — LIDOCAINE 50 MG/G
PATCH TOPICAL
Qty: 30 EACH | Refills: 0 | Status: SHIPPED | OUTPATIENT
Start: 2021-02-15 | End: 2021-07-30 | Stop reason: SDUPTHER

## 2021-02-15 NOTE — PROGRESS NOTES
Discussed blood work results with her. Lipid panel slightly elevated. ASCVD risk of 0.3%. At this time no need to start medication. Likely to be part of genetics the reason why LDL is elevated. Did talk about lifestyle modifications.   Other blood work grossly normal.

## 2021-03-17 NOTE — PROGRESS NOTES
After obtaining consent, and per orders of Dr. Lubna Crowley, injection of Influenza 0.5 ml given by Vinicio Valenzuela LPN. Patient instructed to remain in clinic for 20 minutes afterwards, and to report any adverse reaction to me immediately.

## 2021-07-30 ENCOUNTER — OFFICE VISIT (OUTPATIENT)
Dept: FAMILY MEDICINE CLINIC | Age: 41
End: 2021-07-30
Payer: MEDICAID

## 2021-07-30 VITALS
RESPIRATION RATE: 20 BRPM | SYSTOLIC BLOOD PRESSURE: 108 MMHG | WEIGHT: 149 LBS | DIASTOLIC BLOOD PRESSURE: 71 MMHG | OXYGEN SATURATION: 97 % | TEMPERATURE: 98.1 F | HEART RATE: 91 BPM | BODY MASS INDEX: 23.34 KG/M2

## 2021-07-30 DIAGNOSIS — M54.10 BRACHIAL NEURITIS OF BOTH UPPER EXTREMITIES: Primary | ICD-10-CM

## 2021-07-30 DIAGNOSIS — M62.838 MUSCLE SPASM: ICD-10-CM

## 2021-07-30 PROCEDURE — 99214 OFFICE O/P EST MOD 30 MIN: CPT | Performed by: STUDENT IN AN ORGANIZED HEALTH CARE EDUCATION/TRAINING PROGRAM

## 2021-07-30 RX ORDER — LIDOCAINE 50 MG/G
PATCH TOPICAL
Qty: 30 EACH | Refills: 2 | Status: SHIPPED | OUTPATIENT
Start: 2021-07-30 | End: 2021-10-27 | Stop reason: SDUPTHER

## 2021-07-30 RX ORDER — PREGABALIN 75 MG/1
75 CAPSULE ORAL 2 TIMES DAILY
Qty: 60 CAPSULE | Refills: 0 | Status: SHIPPED | OUTPATIENT
Start: 2021-07-30 | End: 2021-08-13

## 2021-07-30 NOTE — PROGRESS NOTES
Alyssa Lara presents today for   Chief Complaint   Patient presents with    Follow-up       Is someone accompanying this pt? no    Is the patient using any DME equipment during OV? no    Depression Screening:  3 most recent PHQ Screens 1/10/2014   Little interest or pleasure in doing things Not at all   Feeling down, depressed, irritable, or hopeless Not at all   Total Score PHQ 2 0       Learning Assessment:  Learning Assessment 1/17/2019   PRIMARY LEARNER Patient   HIGHEST LEVEL OF EDUCATION - PRIMARY LEARNER  4 YEARS OF COLLEGE   BARRIERS PRIMARY LEARNER NONE   CO-LEARNER CAREGIVER No   PRIMARY LANGUAGE ENGLISH   LEARNER PREFERENCE PRIMARY READING     -     -   ANSWERED BY Patient   RELATIONSHIP SELF       Abuse Screening:  Abuse Screening Questionnaire 1/17/2019   Do you ever feel afraid of your partner? N   Are you in a relationship with someone who physically or mentally threatens you? N   Is it safe for you to go home? Y       Fall Risk  Fall Risk Assessment, last 12 mths 1/17/2019   Able to walk? Yes   Fall in past 12 months? No       Health Maintenance reviewed and discussed and ordered per Provider. Health Maintenance Due   Topic Date Due    Hepatitis C Screening  Never done    COVID-19 Vaccine (1) Never done    PAP AKA CERVICAL CYTOLOGY  12/28/2019   . Coordination of Care:  1. Have you been to the ER, urgent care clinic since your last visit? Hospitalized since your last visit? no    2. Have you seen or consulted any other health care providers outside of the [address] [place] [address] since your last visit? Include any pap smears or colon screening.  no      Last  Checked na  Last UDS Checked na  Last Pain contract signed: na

## 2021-07-31 NOTE — PROGRESS NOTES
Antonieta Bright is a 45 y.o.  female and presents with    Chief Complaint   Patient presents with    Follow-up           Subjective:    Patient states that she has been having issues with regards to her neck. It has been painful, and has been having the shooting pain that she has experienced in the past for which she was seen Dr. Мария Mcghee. Patient tried calling his office and was told that Dr. Мария Mcghee was retiring and next available appointment with had been January 2022. In the past she has been told that she could have trigger shots for this. But at this time would like to get some symptom relief. Patient states also she has been having bilaterally feelings on her arms like she has creatures crawling. Pt was using Gabapentin, but does not feel like this was helping her with her sx.      Patient Active Problem List   Diagnosis Code    Depression F32.9      Past Medical History:   Diagnosis Date    Acne     Anemia NEC     Calculus of kidney     Depression     anxiety    HPV in female     HSV infection     Hypoglycemia     PID (pelvic inflammatory disease)     Recurrent UTI     CARTER (stress urinary incontinence, female)     Urine leukocytes       Past Surgical History:   Procedure Laterality Date    COSMETIC RATE ADJ  03/02/11    abdominal plastic    HX LIPOSUCTION  3/15/13    HX OTHER SURGICAL  05/04/12    Calf implant    HX TUBAL LIGATION  9/18/12      Family History   Problem Relation Age of Onset    Hypertension Maternal Grandmother     Hypertension Maternal Grandfather      Social History     Socioeconomic History    Marital status:      Spouse name: Not on file    Number of children: Not on file    Years of education: Not on file    Highest education level: Not on file   Occupational History    Not on file   Tobacco Use    Smoking status: Never Smoker    Smokeless tobacco: Never Used   Substance and Sexual Activity    Alcohol use: Yes     Comment: Occassionally.  Drug use: No    Sexual activity: Yes     Partners: Male   Other Topics Concern    Not on file   Social History Narrative    Not on file     Social Determinants of Health     Financial Resource Strain:     Difficulty of Paying Living Expenses:    Food Insecurity:     Worried About Running Out of Food in the Last Year:     [address] in the Last Year:    Transportation Needs:     Lack of Transportation (Medical):  Lack of Transportation (Non-Medical):    Physical Activity:     Days of Exercise per Week:     Minutes of Exercise per Session:    Stress:     Feeling of Stress :    Social Connections:     Frequency of Communication with Friends and Family:     Frequency of Social Gatherings with Friends and Family:     Attends [employer] Services:     Active Member of Clubs or Organizations:     Attends Club or Organization Meetings:     Marital Status:    Intimate Partner Violence:     Fear of Current or Ex-Partner:     Emotionally Abused:     Physically Abused:     Sexually Abused:         Current Outpatient Medications   Medication Sig Dispense Refill    pregabalin (LYRICA) 75 mg capsule Take 1 Capsule by mouth two (2) times a day. Max Daily Amount: 150 mg. 60 Capsule 0    lidocaine (Lidoderm) 5 % Apply patch to the affected area for 12 hours a day and remove for 12 hours a day. 30 Each 2    cyclobenzaprine (FLEXERIL) 10 mg tablet Take 1 Tab by mouth three (3) times daily as needed for Muscle Spasm(s). 15 Tab 0    acetaminophen (TYLENOL) 325 mg tablet Take 3 Tabs by mouth three (3) times daily as needed for Pain. 20 Tab 0        ROS   Review of Systems   Constitutional: Negative for chills, fever and malaise/fatigue. HENT: Negative for congestion, ear discharge and ear pain. Eyes: Negative for blurred vision, pain and discharge. Respiratory: Negative for cough and shortness of breath. Cardiovascular: Negative for chest pain and palpitations. Gastrointestinal: Negative for abdominal pain, nausea and vomiting. Genitourinary: Negative for dysuria, frequency and urgency. Musculoskeletal: Positive for neck pain. Skin: Negative for itching and rash. Neurological: Positive for tingling. Negative for dizziness, seizures, loss of consciousness and headaches. Psychiatric/Behavioral: Negative for substance abuse. Objective:  Vitals:    07/30/21 1148   BP: 108/71   Pulse: 91   Resp: 20   Temp: 98.1 °F (36.7 °C)   SpO2: 97%   Weight: 149 lb (67.6 kg)   PainSc:   8   PainLoc: Neck   LMP: 07/13/2021       Physical Exam  Constitutional:       Appearance: Normal appearance. Eyes:      General: No scleral icterus. Right eye: No discharge. Left eye: No discharge. Cardiovascular:      Rate and Rhythm: Normal rate and regular rhythm. Pulses: Normal pulses. Pulmonary:      Effort: Pulmonary effort is normal. No respiratory distress. Breath sounds: Normal breath sounds. Musculoskeletal:      Cervical back: Normal range of motion and neck supple. Tenderness (Around C2/C3, no tautness of muscle appreciated) present. Neurological:      Mental Status: She is alert and oriented to person, place, and time. Cranial Nerves: No cranial nerve deficit. Psychiatric:         Mood and Affect: Mood normal.         Behavior: Behavior normal.         Thought Content: Thought content normal.         Judgment: Judgment normal.           LABS     TESTS  MRI Cervical spine w/o contrast -11/14/2019    FINDINGS:     There is reversal normal cervical lordosis with no evidence of fracture or  spondylolisthesis. Severe disc space narrowing and chronic degenerative endplate marrow changes C5/6. Degenerative endplate marrow edema A2/8 asymmetric to the left. No other evidence of marrow edema or neoplastic marrow signal. Visualized base of brain unremarkable with soft tissues of neck unremarkable.  No abnormal  intrinsic cord signal or enlargement.       C2/3 level: Unremarkable.     C3/4 level: Minimal degenerative spondylosis without stenosis.     C4/5 level: Broad posterior disc protrusion with mild disc space narrowing with  cord flattening with AP diameter canal 7 mm with no significant foraminal stenosis.     C5/6 level: Severe disc space narrowing and degenerative spondylosis with mild  left anterior cord flattening with AP diameter canal 8 mm. Mild to moderate degenerative left-sided and minimal right-sided foraminal stenosis.     C6/7 level: Degenerative spondylosis with mild disc space narrowing with small  broad left posterior disc protrusion with slight cord flattening and very mild canal stenosis. There is severe appearing left foraminal stenosis which appears to mostly be due to uncovertebral spurring although cannot exclude small associated foraminal disc protrusion. Mild degenerative right foraminal stenosis.     C7/T1 level: Unremarkable.     Visualized upper thoracic levels unremarkable.     ______________     IMPRESSION:     1. Degenerative spondylosis C6/7 with asymmetric left-sided degenerative  endplate marrow edema with severe left-sided foraminal stenosis and probable  left C7 nerve root impingement. There are mild canal stenosis at this level.     2. Severe disc space narrowing with chronic degenerative endplate changes R4/4 with mild cord flattening and mild to moderate canal stenosis and mild to  moderate left terminal stenosis.     3. Broad posterior disc protrusion C4/5 causing cord flattening and moderate  canal stenosis, no significant foraminal stenosis.     4. No other acute osseous findings. No intrinsic cord abnormality.       Assessment/Plan:    1. Brachial neuritis of both upper extremities  - pregabalin (LYRICA) 75 mg capsule; Take 1 Capsule by mouth two (2) times a day. Max Daily Amount: 150 mg. Dispense: 60 Capsule; Refill: 0  - lidocaine (Lidoderm) 5 %;  Apply patch to the affected area for 12 hours a day and remove for 12 hours a day. Dispense: 30 Each; Refill: 2  - REFERRAL TO ORTHOPEDICS    2. Muscle spasm  In neck area  - lidocaine (Lidoderm) 5 %; Apply patch to the affected area for 12 hours a day and remove for 12 hours a day. Dispense: 30 Each; Refill: 2       Lab review: no lab studies available for review at time of visit      I have discussed the diagnosis with the patient and the intended plan as seen in the above orders. The patient has received an after-visit summary and questions were answered concerning future plans. I have discussed medication side effects and warnings with the patient as well. I have reviewed the plan of care with the patient, accepted their input and they are in agreement with the treatment goals.          Jenny Stevens MD

## 2021-08-13 ENCOUNTER — OFFICE VISIT (OUTPATIENT)
Dept: FAMILY MEDICINE CLINIC | Age: 41
End: 2021-08-13
Payer: MEDICAID

## 2021-08-13 VITALS
HEART RATE: 79 BPM | BODY MASS INDEX: 23.31 KG/M2 | WEIGHT: 148.8 LBS | DIASTOLIC BLOOD PRESSURE: 74 MMHG | TEMPERATURE: 97.9 F | SYSTOLIC BLOOD PRESSURE: 109 MMHG | OXYGEN SATURATION: 98 % | RESPIRATION RATE: 20 BRPM

## 2021-08-13 DIAGNOSIS — Z13.79 GENETIC TESTING OF FEMALE: ICD-10-CM

## 2021-08-13 DIAGNOSIS — M54.10 BRACHIAL NEURITIS OF BOTH UPPER EXTREMITIES: Primary | ICD-10-CM

## 2021-08-13 PROCEDURE — 99214 OFFICE O/P EST MOD 30 MIN: CPT | Performed by: STUDENT IN AN ORGANIZED HEALTH CARE EDUCATION/TRAINING PROGRAM

## 2021-08-13 RX ORDER — PREGABALIN 25 MG/1
25 CAPSULE ORAL 3 TIMES DAILY
Qty: 90 CAPSULE | Refills: 0 | Status: SHIPPED | OUTPATIENT
Start: 2021-08-13 | End: 2022-02-28 | Stop reason: SDUPTHER

## 2021-08-13 NOTE — PROGRESS NOTES
Alyssa Lara is a 45 y.o.  female and presents with    Chief Complaint   Patient presents with    Follow-up    Medication Evaluation           Subjective: For her neuropathy pain, patient states that she took Lyrica, and the pain improved significantly. However, she got a side effect of frequency of urination, which led her to believe it was a UTI, but was negative for this. Once patient stopped taking the Lyrica symptoms resolved. Patient would like to not have neuropathic pain, however side effects were too much to deal with. Got BRCA test results, Showed she was told she was at increased risk for colon cancer and polyps. States that her GYN told her that she will make the referral for her to have a colonoscopy. Patient Active Problem List   Diagnosis Code    Depression F32.9      Past Medical History:   Diagnosis Date    Acne     Anemia NEC     Calculus of kidney     Depression     anxiety    HPV in female     HSV infection     Hypoglycemia     PID (pelvic inflammatory disease)     Recurrent UTI     CARTER (stress urinary incontinence, female)     Urine leukocytes       Past Surgical History:   Procedure Laterality Date    COSMETIC RATE ADJ  03/02/11    abdominal plastic    HX LIPOSUCTION  3/15/13    HX OTHER SURGICAL  05/04/12    Calf implant    HX TUBAL LIGATION  9/18/12      Family History   Problem Relation Age of Onset    Hypertension Maternal Grandmother     Hypertension Maternal Grandfather      Social History     Socioeconomic History    Marital status:      Spouse name: Not on file    Number of children: Not on file    Years of education: Not on file    Highest education level: Not on file   Occupational History    Not on file   Tobacco Use    Smoking status: Never Smoker    Smokeless tobacco: Never Used   Substance and Sexual Activity    Alcohol use: Yes     Comment: Occassionally.      Drug use: No    Sexual activity: Yes     Partners: Male   Other Topics Concern    Not on file   Social History Narrative    Not on file     Social Determinants of Health     Financial Resource Strain:     Difficulty of Paying Living Expenses:    Food Insecurity:     Worried About Running Out of Food in the Last Year:     [address] in the Last Year:    Transportation Needs:     Lack of Transportation (Medical):  Lack of Transportation (Non-Medical):    Physical Activity:     Days of Exercise per Week:     Minutes of Exercise per Session:    Stress:     Feeling of Stress :    Social Connections:     Frequency of Communication with Friends and Family:     Frequency of Social Gatherings with Friends and Family:     Attends Roman Catholic Services:     Active Member of Clubs or Organizations:     Attends Club or Organization Meetings:     Marital Status:    Intimate Partner Violence:     Fear of Current or Ex-Partner:     Emotionally Abused:     Physically Abused:     Sexually Abused:         Current Outpatient Medications   Medication Sig Dispense Refill    pregabalin (LYRICA) 75 mg capsule Take 1 Capsule by mouth two (2) times a day. Max Daily Amount: 150 mg. 60 Capsule 0    lidocaine (Lidoderm) 5 % Apply patch to the affected area for 12 hours a day and remove for 12 hours a day. 30 Each 2    cyclobenzaprine (FLEXERIL) 10 mg tablet Take 1 Tab by mouth three (3) times daily as needed for Muscle Spasm(s). 15 Tab 0    acetaminophen (TYLENOL) 325 mg tablet Take 3 Tabs by mouth three (3) times daily as needed for Pain. 20 Tab 0        ROS   Review of Systems   Constitutional: Negative for chills, fever and malaise/fatigue. HENT: Negative for congestion, ear discharge and ear pain. Eyes: Negative for blurred vision, pain and discharge. Respiratory: Negative for cough and shortness of breath. Cardiovascular: Negative for chest pain and palpitations. Gastrointestinal: Negative for abdominal pain, nausea and vomiting.    Genitourinary: Positive for frequency. Negative for dysuria and urgency. Skin: Negative for itching and rash. Neurological: Negative for dizziness, seizures, loss of consciousness and headaches. Psychiatric/Behavioral: Negative for substance abuse. Objective:  Vitals:    08/13/21 1224   BP: 109/74   Pulse: 79   Resp: 20   Temp: 97.9 °F (36.6 °C)   SpO2: 98%   Weight: 148 lb 12.8 oz (67.5 kg)   PainSc:   0 - No pain   LMP: 08/08/2021       Physical Exam  Vitals reviewed. Constitutional:       Appearance: Normal appearance. Eyes:      General: No scleral icterus. Right eye: No discharge. Left eye: No discharge. Cardiovascular:      Rate and Rhythm: Normal rate and regular rhythm. Pulmonary:      Effort: Pulmonary effort is normal. No respiratory distress. Breath sounds: Normal breath sounds. Musculoskeletal:      Cervical back: Normal range of motion and neck supple. Neurological:      Mental Status: She is alert and oriented to person, place, and time. Cranial Nerves: No cranial nerve deficit. Psychiatric:         Mood and Affect: Mood normal.         Behavior: Behavior normal.         Thought Content: Thought content normal.         Judgment: Judgment normal.           LABS     TESTS      Assessment/Plan:    1. Brachial neuritis of both upper extremities  Since there was improvement w/ Lyrica but side effects were too much, will try a lower dosis. Pt is to follow up w/ ortho next week. - pregabalin (LYRICA) 25 mg capsule; Take 1 Capsule by mouth three (3) times daily. Max Daily Amount: 75 mg. Dispense: 90 Capsule; Refill: 0    2. Genetic testing of female  Discussed with patient that she should have a copy of the genetic test that she had done. If she does not get a call from GI, patient is to call me to get a new referral for GI.       Lab review: no lab studies available for review at time of visit      I have discussed the diagnosis with the patient and the intended plan as seen in the above orders. The patient has received an after-visit summary and questions were answered concerning future plans. I have discussed medication side effects and warnings with the patient as well. I have reviewed the plan of care with the patient, accepted their input and they are in agreement with the treatment goals.          Mak Merritt MD

## 2021-08-13 NOTE — PROGRESS NOTES
Annel Feng presents today for   Chief Complaint   Patient presents with    Follow-up    Medication Evaluation       Is someone accompanying this pt? no    Is the patient using any DME equipment during OV? no    Depression Screening:  3 most recent PHQ Screens 1/10/2014   Little interest or pleasure in doing things Not at all   Feeling down, depressed, irritable, or hopeless Not at all   Total Score PHQ 2 0       Learning Assessment:  Learning Assessment 1/17/2019   PRIMARY LEARNER Patient   HIGHEST LEVEL OF EDUCATION - PRIMARY LEARNER  4 YEARS OF COLLEGE   BARRIERS PRIMARY LEARNER NONE   CO-LEARNER CAREGIVER No   PRIMARY LANGUAGE ENGLISH   LEARNER PREFERENCE PRIMARY READING     -     -   ANSWERED BY Patient   RELATIONSHIP SELF       Abuse Screening:  Abuse Screening Questionnaire 7/30/2021   Do you ever feel afraid of your partner? N   Are you in a relationship with someone who physically or mentally threatens you? N   Is it safe for you to go home? Y       Fall Risk  Fall Risk Assessment, last 12 mths 1/17/2019   Able to walk? Yes   Fall in past 12 months? No       Health Maintenance reviewed and discussed and ordered per Provider. Health Maintenance Due   Topic Date Due    Hepatitis C Screening  Never done    COVID-19 Vaccine (1) Never done    PAP AKA CERVICAL CYTOLOGY  12/28/2019   . Coordination of Care:  1. Have you been to the ER, urgent care clinic since your last visit? Hospitalized since your last visit? no    2. Have you seen or consulted any other health care providers outside of the [address] [place] [address] since your last visit? Include any pap smears or colon screening.  no      Last  Checked na  Last UDS Checked na  Last Pain contract signed: na

## 2021-08-16 ENCOUNTER — OFFICE VISIT (OUTPATIENT)
Dept: ORTHOPEDIC SURGERY | Age: 41
End: 2021-08-16
Payer: MEDICAID

## 2021-08-16 VITALS
HEART RATE: 78 BPM | TEMPERATURE: 97.9 F | BODY MASS INDEX: 22.91 KG/M2 | OXYGEN SATURATION: 100 % | HEIGHT: 67 IN | WEIGHT: 146 LBS

## 2021-08-16 DIAGNOSIS — M54.12 CERVICAL RADICULOPATHY: Primary | ICD-10-CM

## 2021-08-16 PROCEDURE — 99203 OFFICE O/P NEW LOW 30 MIN: CPT | Performed by: PHYSICAL MEDICINE & REHABILITATION

## 2021-08-16 RX ORDER — AMMONIUM LACTATE 12 G/100G
CREAM TOPICAL
COMMUNITY

## 2021-08-16 RX ORDER — LIFITEGRAST 50 MG/ML
SOLUTION/ DROPS OPHTHALMIC
COMMUNITY
Start: 2021-07-19

## 2021-08-16 RX ORDER — MELOXICAM 15 MG/1
15 TABLET ORAL
Qty: 30 TABLET | Refills: 0 | Status: SHIPPED | OUTPATIENT
Start: 2021-08-16 | End: 2021-09-15

## 2021-08-16 RX ORDER — HALOBETASOL PROPIONATE 0.5 MG/G
CREAM TOPICAL
COMMUNITY

## 2021-08-16 RX ORDER — IBUPROFEN 600 MG/1
TABLET ORAL
COMMUNITY
End: 2021-08-16

## 2021-08-16 RX ORDER — PROMETHAZINE HYDROCHLORIDE 12.5 MG/1
TABLET ORAL
COMMUNITY
Start: 2021-08-04 | End: 2021-10-27

## 2021-08-16 RX ORDER — PHENAZOPYRIDINE HYDROCHLORIDE 100 MG/1
TABLET, FILM COATED ORAL
COMMUNITY
End: 2021-10-27 | Stop reason: SDUPTHER

## 2021-08-16 RX ORDER — ALPRAZOLAM 0.25 MG/1
0.25 TABLET ORAL
COMMUNITY

## 2021-08-16 RX ORDER — NITROFURANTOIN 25; 75 MG/1; MG/1
CAPSULE ORAL
COMMUNITY
Start: 2021-08-04 | End: 2022-09-13

## 2021-08-16 NOTE — PROGRESS NOTES
[address], [address]  [place], [address] [place] [address] Street  Phone: [phone]  Fax: [phone]      Patient: Sukh Bajwa                                                                              MRN: [id number]        YOB: 1980          AGE: 45 y.o. PCP: Alva Smith MD  Date:  08/16/21    Reason for Consultation: Arm Pain (left), Neck Pain, and Back Pain      HPI:  Sukh Bajwa is a 45 y.o. femalewho presents with neck pain which began around 2018. She went to [address] [place] [address] -Dr. Mordechai Skiff with neck pain and a \"crawling\" sensation in bilateral arms. She had and  MRI of her cervical spine diagnosed with cervical spondylosis with left foraminal narrowing at C6/7. She tried gabapentin without relief and was switched to lyrica which seemed to help but she developed frequent urination. She is now restarting at a lower dose. Denies any precipitating incident or trauma. Neurologic symptoms: No numbness, tingling, weakness, bowel or bladder changes. No recent falls      Location: The pain is located in the neck pain  Radiation: The pain does radiate into bilateral arms - feels like something crawling her arms   Pain Score: Currently: 7/10  At Best: 2/10  At Worst: 10/10   Quality: Pain is of a Achy, Burning, Stiff and crawling quality. Aggravating: Pain is exacerbated by lying down, walking, carrying heavy bags  Alleviating:  The pain is alleviated by exercise, stretching    Prior Treatments:   Previous Medications: lyrica- but caused frequent urination, gabapentin, prednisone, flexeril  Current Medications:lyrica 25mg   Previous work-up has included:   MRI Results (most recent):  Results from Hospital Encounter encounter on 11/14/19    MRI CERV SPINE WO CONT    Narrative  EXAM: MRI cervical spine without gadolinium    CLINICAL INDICATION/HISTORY: CERVICAL PAIN  > Additional: Neck pain with left arm pain numbness and tingling    COMPARISON: None. > Reference Exam: None. TECHNIQUE: Sagittal FLAIRT1, FSE T2, and STIR sequences, and axial spin echo T2  and volume 3-D T2*GRE sequences were obtained of the cervical spine without  gadolinium. Imaging performed on wide bore Discovery TQ159a [employer] suite 3T magnet  at [facility]hospitals.    _______________    FINDINGS:    There is reversal normal cervical lordosis with no evidence of fracture or  spondylolisthesis. Severe disc space narrowing and chronic degenerative endplate  marrow changes C5/6. Degenerative endplate marrow edema K7/0 asymmetric to the  left. No other evidence of marrow edema or neoplastic marrow signal. Visualized  base of brain unremarkable with soft tissues of neck unremarkable. No abnormal  intrinsic cord signal or enlargement. C2/3 level: Unremarkable. C3/4 level: Minimal degenerative spondylosis without stenosis. C4/5 level: Broad posterior disc protrusion with mild disc space narrowing with  cord flattening with AP diameter canal 7 mm with no significant foraminal  stenosis. C5/6 level: Severe disc space narrowing and degenerative spondylosis with mild  left anterior cord flattening with AP diameter canal 8 mm. Mild to moderate  degenerative left-sided and minimal right-sided foraminal stenosis. C6/7 level: Degenerative spondylosis with mild disc space narrowing with small  broad left posterior disc protrusion with slight cord flattening and very mild  canal stenosis. There is severe appearing left foraminal stenosis which appears  to mostly be due to uncovertebral spurring although cannot exclude small  associated foraminal disc protrusion. Mild degenerative right foraminal  stenosis. C7/T1 level: Unremarkable. Visualized upper thoracic levels unremarkable.    ______________    Impression  IMPRESSION:    1.  Degenerative spondylosis C6/7 with asymmetric left-sided degenerative  endplate marrow edema with severe left-sided foraminal stenosis and probable  left C7 nerve root impingement. There are mild canal stenosis at this level. 2. Severe disc space narrowing with chronic degenerative endplate changes Q1/3  with mild cord flattening and mild to moderate canal stenosis and mild to  moderate left terminal stenosis. 3. Broad posterior disc protrusion C4/5 causing cord flattening and moderate  canal stenosis, no significant foraminal stenosis. 4. No other acute osseous findings. No intrinsic cord abnormality. Past Medical History:   Past Medical History:   Diagnosis Date    Acne     Anemia NEC     Calculus of kidney     Depression     anxiety    HPV in female     HSV infection     Hypoglycemia     PID (pelvic inflammatory disease)     Recurrent UTI     CARTER (stress urinary incontinence, female)     Urine leukocytes       Past Surgical History:   Past Surgical History:   Procedure Laterality Date    COSMETIC RATE ADJ  03/02/11    abdominal plastic    HX LIPOSUCTION  3/15/13    HX OTHER SURGICAL  05/04/12    Calf implant    HX TUBAL LIGATION  9/18/12      SocHx:   Social History     Tobacco Use    Smoking status: Never Smoker    Smokeless tobacco: Never Used   Substance Use Topics    Alcohol use: Yes     Comment: Occassionally. FamHx:? Family History   Problem Relation Age of Onset    Hypertension Maternal Grandmother     Hypertension Maternal Grandfather        Current Medications:    Current Outpatient Medications   Medication Sig Dispense Refill    ALPRAZolam (XANAX) 0.25 mg tablet Take 0.25 mg by mouth nightly as needed.       ammonium lactate (AMLACTIN) 12 % topical cream ammonium lactate 12 % topical cream   apply to KNEES AND ELBOWS ONCE TO twice a day      halobetasol (ULTRAVATE) 0.05 % topical cream halobetasol propionate 0.05 % topical cream   apply to affected area twice a day      ibuprofen (MOTRIN) 600 mg tablet ibuprofen 600 mg tablet   take 1 tablet by mouth every 6 hours if needed for pain      Xiidra 5 % dpet instill 1 drop into both eyes twice a day      nitrofurantoin, macrocrystal-monohydrate, (MACROBID) 100 mg capsule nitrofurantoin monohydrate/macrocrystals 100 mg capsule      phenazopyridine (PYRIDIUM) 100 mg tablet phenazopyridine 100 mg tablet   take 1 tablet by mouth up to three times a day for 3 days use if . ..  (REFER TO PRESCRIPTION NOTES).  promethazine (PHENERGAN) 12.5 mg tablet promethazine 12.5 mg tablet   take 1 tablet by mouth every 6 hours 20 MINUTES PRIOR TO PAIN MEDICATION FOR NAUSEA      pregabalin (LYRICA) 25 mg capsule Take 1 Capsule by mouth three (3) times daily. Max Daily Amount: 75 mg. 90 Capsule 0    lidocaine (Lidoderm) 5 % Apply patch to the affected area for 12 hours a day and remove for 12 hours a day. 30 Each 2    acetaminophen (TYLENOL) 325 mg tablet Take 3 Tabs by mouth three (3) times daily as needed for Pain. 20 Tab 0    cyclobenzaprine (FLEXERIL) 10 mg tablet Take 1 Tab by mouth three (3) times daily as needed for Muscle Spasm(s). 15 Tab 0      Allergies:  No Known Allergies     Review of Systems:   Gen:    Denied fevers, chills, malaise, fatigue, weight changes   Resp: Denied shortness of breath, cough, wheezing   CVS: Denied chest pain, palpitations   : Denied urinary urgency, frequency, incontinence   GI: Denied nausea, vomiting, constipation, diarrhea   Skin: Denied rashes, wounds   Psych: Denied anxiety, depression   Vasc: Denied claudication, ulcers   Hem: Denied easy bruising/bleeding   MSK: See HPI   Neuro: See HPI         Physical Exam     Vital Signs:   Visit Vitals  Pulse 78   Temp 97.9 °F (36.6 °C) (Skin)   Ht 5' 7\" (1.702 m)   Wt 146 lb (66.2 kg)   LMP 08/08/2021   SpO2 100% Comment: RA   BMI 22.87 kg/m²      General: ??????? Well nourished and well developed female without any acute distress   Psychiatric: ?  Alert and oriented x 3 with normal mood    HEENT: ????????   Atraumatic   Respiratory:   Breathing non-labored and non dyspneic   CV: ???????????????? Peripheral pulses intact, no peripheral edema   Skin: ????????????? No rashes       Neurologic: ?? Sensation: normal and grossly intact thebilateral, upper extremity(s)  Strength: 5/5 in the bilateral, upper extremity(s)   Reflexes: reveals 2+ symmetric DTRs throughout  Gait: normal and tandem gait   Upper tract signs:  Wall's negative ? Musculoskeletal: Cervical Exam   Alignment: Abnormal straightening of cervical lordosis , rounded shoudlers  Atrophy: None     Tenderness to Palpation:   Cervical paraspinals: Positive  Cervical spinous processes: Negative  Upper thoracic paraspinals: Negative  Upper thoracic spinous processes: Negative  Upper trapezius:  Positive    Cervical ROM: Abnormal pain with flexion and lateral bend to the left  Shoulder ROM: No reproduction of pain with movement     Special Tests    Spurlings Maneuver: Negative  Shoulder Abduction Test:Negative  Hoffmans: Negative  Hawkin's Test: Negative  Neer's Test: Negative  Empty Can Test: Negative         Medical Decision Making:    Images: The imaging results as well as the actual images of the studies below were reviewed and visualized. MRI cervical spine- disc space narrowing C5/6 C6/7   Severe left foraminal narrowing C6/7 with C7 nerve impingement due to uncovertebral hypertrophy       Assessment:   1. Cervical spondylosis  2. C6/7 severe left foraminal narrowing with C7 nerve impingement     Plan:      -Physical therapy -referral to PT for posture training, cervical ROM etc    -Medications - cont lyrica 25mg tid. Will try meloxicam 15mg with food as needed to start once cleared post upcoming hysterectomy. Counseled regarding side effects and appropriate administration of medications.    -Diagnostics/Imaging - Reviewed MRI cervical spine  -Injections - Consider YANE  -Education - The patient's diagnosis, prognosis and treatment options were discussed today. All questions were answered.    F/U - in 8 week(s) or sooner if needed.   Consider cervical YANE         Dwain Riley MD  [facility]

## 2021-08-16 NOTE — PATIENT INSTRUCTIONS
Healthy Upper Back: Exercises  Introduction  Here are some examples of exercises for your upper back. Start each exercise slowly. Ease off the exercise if you start to have pain. Your doctor or physical therapist will tell you when you can start these exercises and which ones will work best for you. How to do the exercises  Lower neck and upper back stretch   1. Stretch your arms out in front of your body. Clasp one hand on top of your other hand. 2. Gently reach out so that you feel your shoulder blades stretching away from each other. 3. Gently bend your head forward. 4. Hold for 15 to 30 seconds. 5. Repeat 2 to 4 times. Midback stretch   If you have knee pain, do not do this exercise. 1. Kneel on the floor, and sit back on your ankles. 2. Lean forward, place your hands on the floor, and stretch your arms out in front of you. Rest your head between your arms. 3. Gently push your chest toward the floor, reaching as far in front of you as possible. 4. Hold for 15 to 30 seconds. 5. Repeat 2 to 4 times. Shoulder rolls   1. Sit comfortably with your feet shoulder-width apart. You can also do this exercise while standing. 2. Roll your shoulders up, then back, and then down in a smooth, circular motion. 3. Repeat 2 to 4 times. Wall push-up   1. Stand against a wall with your feet about 12 to 24 inches back from the wall. If you feel any pain when you do this exercise, stand closer to the wall. 2. Place your hands on the wall slightly wider apart than your shoulders, and lean forward. 3. Gently lean your body toward the wall. Then push back to your starting position. Keep the motion smooth and controlled. 4. Repeat 8 to 12 times. Resisted shoulder blade squeeze   For this exercise, you will need elastic exercise material, such as surgical tubing or Thera-Band. 1. Sit or stand, holding the band in both hands in front of you. Keep your elbows close to your sides, bent at a 90-degree angle. Your palms should face up. 2. Squeeze your shoulder blades together, and move your arms to the outside, stretching the band. Be sure to keep your elbows at your sides while you do this. 3. Relax. 4. Repeat 8 to 12 times. Resisted rows   For this exercise, you will need elastic exercise material, such as surgical tubing or Thera-Band. 1. Put the band around a solid object, such as a bedpost, at about waist level. Hold one end of the band in each hand. 2. With your elbows at your sides and bent to 90 degrees, pull the band back to move your shoulder blades toward each other. Return to the starting position. 3. Repeat 8 to 12 times. Follow-up care is a key part of your treatment and safety. Be sure to make and go to all appointments, and call your doctor if you are having problems. It's also a good idea to know your test results and keep a list of the medicines you take. Where can you learn more? Go to http://www.gray.com/  Enter O436 in the search box to learn more about \"Healthy Upper Back: Exercises. \"  Current as of: November 16, 2020               Content Version: 12.8  © 1217-8746 [employer]. Care instructions adapted under license by [employer] (which disclaims liability or warranty for this information). If you have questions about a medical condition or this instruction, always ask your healthcare professional. [place] [address] any warranty or liability for your use of this information.

## 2021-08-16 NOTE — PROGRESS NOTES
Brynn Manuel presents today for   Chief Complaint   Patient presents with    Arm Pain     left    Neck Pain    Back Pain       Is someone accompanying this pt? no    Is the patient using any DME equipment during OV? no    Depression Screening:  3 most recent PHQ Screens 1/10/2014   Little interest or pleasure in doing things Not at all   Feeling down, depressed, irritable, or hopeless Not at all   Total Score PHQ 2 0       Learning Assessment:  Learning Assessment 1/17/2019   PRIMARY LEARNER Patient   HIGHEST LEVEL OF EDUCATION - PRIMARY LEARNER  4 YEARS OF COLLEGE   BARRIERS PRIMARY LEARNER NONE   CO-LEARNER CAREGIVER No   PRIMARY LANGUAGE ENGLISH   LEARNER PREFERENCE PRIMARY READING     -     -   ANSWERED BY Patient   RELATIONSHIP SELF       Abuse Screening:  Abuse Screening Questionnaire 7/30/2021   Do you ever feel afraid of your partner? N   Are you in a relationship with someone who physically or mentally threatens you? N   Is it safe for you to go home? Y       Fall Risk  Fall Risk Assessment, last 12 mths 1/17/2019   Able to walk? Yes   Fall in past 12 months? No       Coordination of Care:  1. Have you been to the ER, urgent care clinic since your last visit? no  Hospitalized since your last visit? no    2. Have you seen or consulted any other health care providers outside of the [address] [place] [address] since your last visit? Yes, OB/GYN Include any pap smears or colon screening.  no

## 2021-10-27 ENCOUNTER — OFFICE VISIT (OUTPATIENT)
Dept: FAMILY MEDICINE CLINIC | Age: 41
End: 2021-10-27
Payer: MEDICAID

## 2021-10-27 VITALS
BODY MASS INDEX: 23.21 KG/M2 | RESPIRATION RATE: 20 BRPM | OXYGEN SATURATION: 100 % | TEMPERATURE: 98.1 F | HEART RATE: 80 BPM | SYSTOLIC BLOOD PRESSURE: 127 MMHG | WEIGHT: 148.2 LBS | DIASTOLIC BLOOD PRESSURE: 81 MMHG

## 2021-10-27 DIAGNOSIS — R05.9 COUGH: ICD-10-CM

## 2021-10-27 DIAGNOSIS — N39.0 RECURRENT UTI: ICD-10-CM

## 2021-10-27 DIAGNOSIS — M67.431 GANGLION OF RIGHT WRIST: Primary | ICD-10-CM

## 2021-10-27 DIAGNOSIS — M54.10 BRACHIAL NEURITIS OF BOTH UPPER EXTREMITIES: ICD-10-CM

## 2021-10-27 PROCEDURE — 99214 OFFICE O/P EST MOD 30 MIN: CPT | Performed by: STUDENT IN AN ORGANIZED HEALTH CARE EDUCATION/TRAINING PROGRAM

## 2021-10-27 RX ORDER — PHENAZOPYRIDINE HYDROCHLORIDE 100 MG/1
TABLET, FILM COATED ORAL
Qty: 27 TABLET | Refills: 2 | Status: SHIPPED | OUTPATIENT
Start: 2021-10-27 | End: 2022-10-10 | Stop reason: SDUPTHER

## 2021-10-27 RX ORDER — LIDOCAINE 50 MG/G
PATCH TOPICAL
Qty: 30 EACH | Refills: 2 | Status: SHIPPED | OUTPATIENT
Start: 2021-10-27

## 2021-10-27 RX ORDER — BENZONATATE 200 MG/1
200 CAPSULE ORAL
Qty: 21 CAPSULE | Refills: 0 | Status: SHIPPED | OUTPATIENT
Start: 2021-10-27 | End: 2021-11-03

## 2021-10-27 RX ORDER — FAMOTIDINE 40 MG/1
40 TABLET, FILM COATED ORAL DAILY
Qty: 30 TABLET | Refills: 0 | Status: SHIPPED | OUTPATIENT
Start: 2021-10-27

## 2021-10-27 RX ORDER — CODEINE PHOSPHATE AND GUAIFENESIN 10; 100 MG/5ML; MG/5ML
5 SOLUTION ORAL
Qty: 100 ML | Refills: 0 | Status: SHIPPED | OUTPATIENT
Start: 2021-10-27 | End: 2021-11-01

## 2021-10-27 RX ORDER — LEVOCETIRIZINE DIHYDROCHLORIDE 5 MG/1
5 TABLET, FILM COATED ORAL
Qty: 30 TABLET | Refills: 0 | Status: SHIPPED | OUTPATIENT
Start: 2021-10-27

## 2021-10-27 NOTE — PROGRESS NOTES
Ally Levin is a 45 y.o.  female and presents with    Chief Complaint   Patient presents with    Follow-up     cough, wrist lump           Subjective:   R wrist lump started 5 days ago. It is painful, in that when she hasp ressure on it, there is travelling shooting pain in her hand. Dry cough for the last 2 weeks. Had a COVID test which was negative. Has taken zyrtec and claritin which did not work. Taken theraflu w/o relief. Has taken Mucinex and no reliex. Feels like pressure feeling on the lungs. Patient is concerned because he is starting to have issues at her work, where people are looking at her like she has Covid at this time. 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 For her brachial neuritis, patient has been following with Dr. Odalys Pandya. Refilling her lidocaine today. States that she has been feeling better about this. Patient Active Problem List   Diagnosis Code    Depression F32. A      Past Medical History:   Diagnosis Date    Acne     Anemia NEC     Calculus of kidney     Depression     anxiety    HPV in female     HSV infection     Hypoglycemia     PID (pelvic inflammatory disease)     Recurrent UTI     CARTER (stress urinary incontinence, female)     Urine leukocytes       Past Surgical History:   Procedure Laterality Date    COSMETIC RATE ADJ  03/02/11    abdominal plastic    HX LIPOSUCTION  3/15/13    HX OTHER SURGICAL  05/04/12    Calf implant    HX TUBAL LIGATION  9/18/12      Family History   Problem Relation Age of Onset    Hypertension Maternal Grandmother     Hypertension Maternal Grandfather      Social History     Socioeconomic History    Marital status:      Spouse name: Not on file    Number of children: Not on file    Years of education: Not on file    Highest education level: Not on file   Occupational History    Not on file   Tobacco Use    Smoking status: Never Smoker    Smokeless tobacco: Never Used   Substance and Sexual Activity    Alcohol use: Yes     Comment: Occassionally.  Drug use: No    Sexual activity: Yes     Partners: Male   Other Topics Concern    Not on file   Social History Narrative    Not on file     Social Determinants of Health     Financial Resource Strain:     Difficulty of Paying Living Expenses:    Food Insecurity:     Worried About Running Out of Food in the Last Year:     [address] in the Last Year:    Transportation Needs:     Lack of Transportation (Medical):  Lack of Transportation (Non-Medical):    Physical Activity:     Days of Exercise per Week:     Minutes of Exercise per Session:    Stress:     Feeling of Stress :    Social Connections:     Frequency of Communication with Friends and Family:     Frequency of Social Gatherings with Friends and Family:     Attends [employer] Services:     Active Member of Clubs or Organizations:     Attends Club or Organization Meetings:     Marital Status:    Intimate Partner Violence:     Fear of Current or Ex-Partner:     Emotionally Abused:     Physically Abused:     Sexually Abused:         Current Outpatient Medications   Medication Sig Dispense Refill    ALPRAZolam (XANAX) 0.25 mg tablet Take 0.25 mg by mouth nightly as needed.       ammonium lactate (AMLACTIN) 12 % topical cream ammonium lactate 12 % topical cream   apply to KNEES AND ELBOWS ONCE TO twice a day      halobetasol (ULTRAVATE) 0.05 % topical cream halobetasol propionate 0.05 % topical cream   apply to affected area twice a day      Xiidra 5 % dpet instill 1 drop into both eyes twice a day      nitrofurantoin, macrocrystal-monohydrate, (MACROBID) 100 mg capsule nitrofurantoin monohydrate/macrocrystals 100 mg capsule      phenazopyridine (PYRIDIUM) 100 mg tablet phenazopyridine 100 mg tablet   take 1 tablet by mouth up to three times a day for 3 days use if . ..  (REFER TO PRESCRIPTION NOTES).  promethazine (PHENERGAN) 12.5 mg tablet promethazine 12.5 mg tablet   take 1 tablet by mouth every 6 hours 20 MINUTES PRIOR TO PAIN MEDICATION FOR NAUSEA      pregabalin (LYRICA) 25 mg capsule Take 1 Capsule by mouth three (3) times daily. Max Daily Amount: 75 mg. 90 Capsule 0    lidocaine (Lidoderm) 5 % Apply patch to the affected area for 12 hours a day and remove for 12 hours a day. 30 Each 2    acetaminophen (TYLENOL) 325 mg tablet Take 3 Tabs by mouth three (3) times daily as needed for Pain. 20 Tab 0        ROS   Review of Systems   Constitutional: Negative for chills, fever and malaise/fatigue. HENT: Negative for congestion, ear discharge and ear pain. Eyes: Negative for blurred vision, pain and discharge. Respiratory: Negative for cough and shortness of breath. Cardiovascular: Negative for chest pain and palpitations. Gastrointestinal: Negative for abdominal pain, nausea and vomiting. Genitourinary: Negative for dysuria, frequency and urgency. Skin: Negative for itching and rash. Neurological: Positive for tingling. Negative for dizziness, seizures, loss of consciousness and headaches. Psychiatric/Behavioral: Negative for substance abuse. Objective:  Vitals:    10/27/21 1612   BP: 127/81   Pulse: 80   Resp: 20   Temp: 98.1 °F (36.7 °C)   SpO2: 100%   Weight: 148 lb 3.2 oz (67.2 kg)   PainSc:   6   LMP: 08/08/2021       Physical Exam  Constitutional:       Appearance: Normal appearance. Eyes:      General: No scleral icterus. Right eye: No discharge. Left eye: No discharge. Cardiovascular:      Rate and Rhythm: Normal rate and regular rhythm. Pulses: Normal pulses. Pulmonary:      Effort: Pulmonary effort is normal. No respiratory distress. Breath sounds: Normal breath sounds. Musculoskeletal:      Cervical back: Normal range of motion and neck supple.    Neurological:      Mental Status: She is alert and oriented to person, place, and time. Cranial Nerves: No cranial nerve deficit. Psychiatric:         Mood and Affect: Mood normal.         Behavior: Behavior normal.         Thought Content: Thought content normal.         Judgment: Judgment normal.           LABS     TESTS      Assessment/Plan:    1. Ganglion of right wrist  - REFERRAL TO HAND SURGERY    2. Cough  Will treat for both allergy and heartburn.   - guaiFENesin-codeine (ROBITUSSIN AC) 100-10 mg/5 mL solution; Take 5 mL by mouth four (4) times daily as needed for Cough for up to 5 days. Max Daily Amount: 20 mL. Dispense: 100 mL; Refill: 0  - benzonatate (TESSALON) 200 mg capsule; Take 1 Capsule by mouth three (3) times daily as needed for Cough for up to 7 days. Dispense: 21 Capsule; Refill: 0  - levocetirizine (XYZAL) 5 mg tablet; Take 1 Tablet by mouth nightly. Dispense: 30 Tablet; Refill: 0  - famotidine (PEPCID) 40 mg tablet; Take 1 Tablet by mouth daily. Dispense: 30 Tablet; Refill: 0    3. Brachial neuritis of both upper extremities  - lidocaine (Lidoderm) 5 %; Apply patch to the affected area for 12 hours a day and remove for 12 hours a day. Dispense: 30 Each; Refill: 2    4. Recurrent UTI  - phenazopyridine (PYRIDIUM) 100 mg tablet; phenazopyridine 100 mg tablet  take 1 tablet by mouth up to three times a day for 3 days use if . ..  (REFER TO PRESCRIPTION NOTES). Dispense: 27 Tablet; Refill: 2    Lab review: no lab studies available for review at time of visit      I have discussed the diagnosis with the patient and the intended plan as seen in the above orders. The patient has received an after-visit summary and questions were answered concerning future plans. I have discussed medication side effects and warnings with the patient as well. I have reviewed the plan of care with the patient, accepted their input and they are in agreement with the treatment goals.          Kulwant Zuniga MD

## 2021-10-27 NOTE — PROGRESS NOTES
Sumaya Carson presents today for   Chief Complaint   Patient presents with    Follow-up     cough, wrist lump       Is someone accompanying this pt? no    Is the patient using any DME equipment during OV? no    Depression Screening:  3 most recent PHQ Screens 1/10/2014   Little interest or pleasure in doing things Not at all   Feeling down, depressed, irritable, or hopeless Not at all   Total Score PHQ 2 0       Learning Assessment:  Learning Assessment 1/17/2019   PRIMARY LEARNER Patient   HIGHEST LEVEL OF EDUCATION - PRIMARY LEARNER  4 YEARS OF COLLEGE   BARRIERS PRIMARY LEARNER NONE   CO-LEARNER CAREGIVER No   PRIMARY LANGUAGE ENGLISH   LEARNER PREFERENCE PRIMARY READING     -     -   ANSWERED BY Patient   RELATIONSHIP SELF       Abuse Screening:  Abuse Screening Questionnaire 7/30/2021   Do you ever feel afraid of your partner? N   Are you in a relationship with someone who physically or mentally threatens you? N   Is it safe for you to go home? Y       Fall Risk  Fall Risk Assessment, last 12 mths 1/17/2019   Able to walk? Yes   Fall in past 12 months? No       Health Maintenance reviewed and discussed and ordered per Provider. Health Maintenance Due   Topic Date Due    Hepatitis C Screening  Never done    Flu Vaccine (1) 09/01/2021   . Coordination of Care:  1. Have you been to the ER, urgent care clinic since your last visit? Hospitalized since your last visit? yes    2. Have you seen or consulted any other health care providers outside of the [address] [place] [address] since your last visit? Include any pap smears or colon screening.  no      Last  Checked na  Last UDS Checked na  Last Pain contract signed: na

## 2022-02-28 ENCOUNTER — VIRTUAL VISIT (OUTPATIENT)
Dept: ORTHOPEDIC SURGERY | Age: 42
End: 2022-02-28
Payer: COMMERCIAL

## 2022-02-28 DIAGNOSIS — M54.10 BRACHIAL NEURITIS OF BOTH UPPER EXTREMITIES: ICD-10-CM

## 2022-02-28 DIAGNOSIS — M54.12 CERVICAL RADICULOPATHY: Primary | ICD-10-CM

## 2022-02-28 PROCEDURE — 99214 OFFICE O/P EST MOD 30 MIN: CPT | Performed by: PHYSICAL MEDICINE & REHABILITATION

## 2022-02-28 RX ORDER — MELOXICAM 15 MG/1
15 TABLET ORAL
Qty: 30 TABLET | Refills: 1 | Status: SHIPPED | OUTPATIENT
Start: 2022-02-28 | End: 2022-05-04

## 2022-02-28 RX ORDER — PREGABALIN 25 MG/1
CAPSULE ORAL
Qty: 120 CAPSULE | Refills: 2 | Status: SHIPPED | OUTPATIENT
Start: 2022-02-28 | End: 2022-09-13

## 2022-02-28 NOTE — PROGRESS NOTES
[address]  [address], [address]  [address]  Phone: [phone]  Fax: [phone]      Patient: Charlie Cadet                                                                              MRN: [id number]        YOB: 1980          AGE: 37 y.o. PCP: Simran Sofia MD  Date:  02/28/22    Reason for Consultation: Neck Pain  Video Visit     HPI:  Charlie Cadet is a 37 y.o. femalewho presented with neck pain which began around 2018. She went to [address] [place] [address] -Dr. Blair Tejada with neck pain and a \"crawling\" sensation in bilateral arms. She had and  MRI of her cervical spine diagnosed with cervical spondylosis with left foraminal narrowing at C6/7. She tried gabapentin without relief and was switched to lyrica 25mg tid which seemsto help. She also takes meloxicam intermittently. She has not been able to start PT yet due to her busy schedule. She continues to have a crawling sensation in her arms and back at night  Denies any precipitating incident or trauma. Neurologic symptoms: No numbness, tingling, weakness, bowel or bladder changes. No recent falls      Location: The pain is located in the neck pain  Radiation: The pain does radiate into bilateral arms - feels like something crawling her arms   Pain Score: Currently: 7/10  At Best: 2/10  At Worst: 10/10   Quality: Pain is of a Achy, Burning, Stiff and crawling quality. Aggravating: Pain is exacerbated by lying down, walking, carrying heavy bags  Alleviating:  The pain is alleviated by exercise, stretching    Prior Treatments:   Previous Medications: lyrica- but caused frequent urination, gabapentin, prednisone, flexeril  Current Medications:lyrica 25mg tid, meloxicam 15mg prn  Previous work-up has included:   MRI Results (most recent):  Results from Hospital Encounter encounter on 11/14/19    MRI CERV SPINE WO CONT    Narrative  EXAM: MRI cervical spine without gadolinium    CLINICAL INDICATION/HISTORY: CERVICAL PAIN  > Additional: Neck pain with left arm pain numbness and tingling    COMPARISON: None. > Reference Exam: None. TECHNIQUE: Sagittal FLAIRT1, FSE T2, and STIR sequences, and axial spin echo T2  and volume 3-D T2*GRE sequences were obtained of the cervical spine without  gadolinium. Imaging performed on wide bore Discovery [id number] [employer] suite 3T magnet  at [facility].    _______________    FINDINGS:    There is reversal normal cervical lordosis with no evidence of fracture or  spondylolisthesis. Severe disc space narrowing and chronic degenerative endplate  marrow changes C5/6. Degenerative endplate marrow edema L7/8 asymmetric to the  left. No other evidence of marrow edema or neoplastic marrow signal. Visualized  base of brain unremarkable with soft tissues of neck unremarkable. No abnormal  intrinsic cord signal or enlargement. C2/3 level: Unremarkable. C3/4 level: Minimal degenerative spondylosis without stenosis. C4/5 level: Broad posterior disc protrusion with mild disc space narrowing with  cord flattening with AP diameter canal 7 mm with no significant foraminal  stenosis. C5/6 level: Severe disc space narrowing and degenerative spondylosis with mild  left anterior cord flattening with AP diameter canal 8 mm. Mild to moderate  degenerative left-sided and minimal right-sided foraminal stenosis. C6/7 level: Degenerative spondylosis with mild disc space narrowing with small  broad left posterior disc protrusion with slight cord flattening and very mild  canal stenosis. There is severe appearing left foraminal stenosis which appears  to mostly be due to uncovertebral spurring although cannot exclude small  associated foraminal disc protrusion. Mild degenerative right foraminal  stenosis. C7/T1 level: Unremarkable. Visualized upper thoracic levels unremarkable.    ______________    Impression  IMPRESSION:    1. Degenerative spondylosis C6/7 with asymmetric left-sided degenerative  endplate marrow edema with severe left-sided foraminal stenosis and probable  left C7 nerve root impingement. There are mild canal stenosis at this level. 2. Severe disc space narrowing with chronic degenerative endplate changes B3/1  with mild cord flattening and mild to moderate canal stenosis and mild to  moderate left terminal stenosis. 3. Broad posterior disc protrusion C4/5 causing cord flattening and moderate  canal stenosis, no significant foraminal stenosis. 4. No other acute osseous findings. No intrinsic cord abnormality. Past Medical History:   Past Medical History:   Diagnosis Date    Acne     Anemia NEC     Calculus of kidney     Depression     anxiety    HPV in female     HSV infection     Hypoglycemia     PID (pelvic inflammatory disease)     Recurrent UTI     CARTER (stress urinary incontinence, female)     Urine leukocytes       Past Surgical History:   Past Surgical History:   Procedure Laterality Date    COSMETIC RATE ADJ  03/02/11    abdominal plastic    HX LIPOSUCTION  3/15/13    HX OTHER SURGICAL  05/04/12    Calf implant    HX TUBAL LIGATION  9/18/12      SocHx:   Social History     Tobacco Use    Smoking status: Never Smoker    Smokeless tobacco: Never Used   Substance Use Topics    Alcohol use: Yes     Comment: Occassionally. FamHx:? Family History   Problem Relation Age of Onset    Hypertension Maternal Grandmother     Hypertension Maternal Grandfather        Current Medications:    Current Outpatient Medications   Medication Sig Dispense Refill    meloxicam (MOBIC) 15 mg tablet Take 1 Tablet by mouth daily as needed for Pain for up to 30 days. 30 Tablet 1    pregabalin (LYRICA) 25 mg capsule 25 mg in the morning, 25mg in the afternoon and 50mg at night 120 Capsule 2    levocetirizine (XYZAL) 5 mg tablet Take 1 Tablet by mouth nightly.  30 Tablet 0    famotidine (PEPCID) 40 mg tablet Take 1 Tablet by mouth daily. 30 Tablet 0    phenazopyridine (PYRIDIUM) 100 mg tablet phenazopyridine 100 mg tablet  take 1 tablet by mouth up to three times a day for 3 days use if . ..  (REFER TO PRESCRIPTION NOTES). 27 Tablet 2    lidocaine (Lidoderm) 5 % Apply patch to the affected area for 12 hours a day and remove for 12 hours a day. 30 Each 2    ALPRAZolam (XANAX) 0.25 mg tablet Take 0.25 mg by mouth nightly as needed.  ammonium lactate (AMLACTIN) 12 % topical cream ammonium lactate 12 % topical cream   apply to KNEES AND ELBOWS ONCE TO twice a day      halobetasol (ULTRAVATE) 0.05 % topical cream halobetasol propionate 0.05 % topical cream   apply to affected area twice a day      Xiidra 5 % dpet instill 1 drop into both eyes twice a day      nitrofurantoin, macrocrystal-monohydrate, (MACROBID) 100 mg capsule nitrofurantoin monohydrate/macrocrystals 100 mg capsule      acetaminophen (TYLENOL) 325 mg tablet Take 3 Tabs by mouth three (3) times daily as needed for Pain. 20 Tab 0      Allergies:  No Known Allergies     Review of Systems:   Gen:    Denied fevers, chills, malaise, fatigue, weight changes   Resp: Denied shortness of breath, cough, wheezing   CVS: Denied chest pain, palpitations   : Denied urinary urgency, frequency, incontinence   GI: Denied nausea, vomiting, constipation, diarrhea   Skin: Denied rashes, wounds   Psych: Denied anxiety, depression   Vasc: Denied claudication, ulcers   Hem: Denied easy bruising/bleeding   MSK: See HPI   Neuro: See HPI       Medical Decision Making:    Images: The imaging results as well as the actual images of the studies below were reviewed and visualized. MRI cervical spine- disc space narrowing C5/6 C6/7   Severe left foraminal narrowing C6/7 with C7 nerve impingement due to uncovertebral hypertrophy       Assessment:   1. Cervical spondylosis  2.  C6/7 severe left foraminal narrowing with C7 nerve impingement     Plan:      -Physical therapy -referral to PT for posture training, cervical ROM etc  - noew order placed  -Medications - increase lyrica 25mg am, 25mg in the afternoon, 50mg at night. Renewed meloxcaim 15mg to take prn, discussed limiting use  Counseled regarding side effects and appropriate administration of medications.    -Diagnostics/Imaging - Reviewed MRI cervical spine  -Injections - Discussed YANE, she had a recent reaction to allergy testing so will hold off on any injection now  -Education - The patient's diagnosis, prognosis and treatment options were discussed today. All questions were answered. F/U - in 4 week(s) or sooner if needed. Consider cervical YANE         [facility]      I was in the office while conducting this encounter. Patient not in the office    Consent:  She and/or her healthcare decision maker is aware that this patient-initiated Telehealth encounter is a billable service, with coverage as determined by her insurance carrier. She is aware that she may receive a bill and has provided verbal consent to proceed: Yes    This virtual visit was conducted via [employer]. Pursuant to the emergency declaration under the [facility] waiver authority and the [employer] Act, this Virtual  Visit was conducted to reduce the patient's risk of exposure to COVID-19 and provide continuity of care for an established patient. Services were provided through a video synchronous discussion virtually to substitute for in-person clinic visit. Due to this being a TeleHealth evaluation, many elements of the physical examination are unable to be assessed. Total Time: minutes: 21-30 minutes.

## 2022-05-03 DIAGNOSIS — M54.12 CERVICAL RADICULOPATHY: ICD-10-CM

## 2022-05-04 RX ORDER — MELOXICAM 15 MG/1
TABLET ORAL
Qty: 30 TABLET | Refills: 1 | Status: SHIPPED | OUTPATIENT
Start: 2022-05-04 | End: 2022-07-05

## 2022-07-03 DIAGNOSIS — M54.12 CERVICAL RADICULOPATHY: ICD-10-CM

## 2022-07-05 RX ORDER — MELOXICAM 15 MG/1
TABLET ORAL
Qty: 30 TABLET | Refills: 1 | Status: SHIPPED | OUTPATIENT
Start: 2022-07-05 | End: 2022-09-06

## 2022-08-10 ENCOUNTER — TRANSCRIBE ORDER (OUTPATIENT)
Dept: SCHEDULING | Age: 42
End: 2022-08-10

## 2022-08-10 DIAGNOSIS — Z12.31 ENCOUNTER FOR SCREENING MAMMOGRAM FOR MALIGNANT NEOPLASM OF BREAST: Primary | ICD-10-CM

## 2022-09-04 DIAGNOSIS — M54.12 CERVICAL RADICULOPATHY: ICD-10-CM

## 2022-09-06 RX ORDER — MELOXICAM 15 MG/1
TABLET ORAL
Qty: 30 TABLET | Refills: 1 | Status: SHIPPED | OUTPATIENT
Start: 2022-09-06 | End: 2022-10-10

## 2022-09-13 ENCOUNTER — OFFICE VISIT (OUTPATIENT)
Dept: ORTHOPEDIC SURGERY | Age: 42
End: 2022-09-13
Payer: COMMERCIAL

## 2022-09-13 VITALS — BODY MASS INDEX: 23.34 KG/M2 | HEART RATE: 75 BPM | OXYGEN SATURATION: 100 % | WEIGHT: 149 LBS | TEMPERATURE: 97.6 F

## 2022-09-13 DIAGNOSIS — M54.12 CERVICAL RADICULOPATHY: Primary | ICD-10-CM

## 2022-09-13 DIAGNOSIS — M47.812 CERVICAL SPONDYLOSIS: ICD-10-CM

## 2022-09-13 PROCEDURE — 99214 OFFICE O/P EST MOD 30 MIN: CPT | Performed by: PHYSICAL MEDICINE & REHABILITATION

## 2022-09-13 NOTE — PROGRESS NOTES
[address]  [address], [address]  [address]  Phone: [phone]  Fax: [phone]      Patient: Ortiz Paris                                                                              MRN: [id number]        YOB: 1980          AGE: 37 y.o. PCP: Bo Mills MD  Date:  09/13/22    Reason for Consultation: Follow-up (UBP)  Video Visit     HPI:  Ortiz Paris is a 37 y.o. female who presented with neck pain which began around 2018. She went to [address] [place] [address] -Dr. Sharee Fried with neck pain and a \"crawling\" sensation in bilateral arms. She had and  MRI of her cervical spine diagnosed with cervical spondylosis with left foraminal narrowing at C6/7. She tried gabapentin without relief and was switched to lyrica  75mg bid but found that it gave her altered sensations in her arms. She completed a course of PT at [facility] April 2022- and has been doing her home exercises since that time. Neurologic symptoms: No numbness, tingling, weakness, bowel or bladder changes. No recent falls      Location: The pain is located in the neck pain  Radiation: The pain does radiate into bilateral arms - feels like something crawling her arms   Pain Score: Currently: 7/10  At Best: 2/10  At Worst: 10/10   Quality: Pain is of a Achy, Burning, Stiff and crawling  quality. Aggravating: Pain is exacerbated by lying down, walking, carrying heavy bags  Alleviating:  The pain is alleviated by exercise, stretching    Prior Treatments:   Previous Medications: lyrica- but caused frequent urination and altered sensation,  gabapentin, prednisone, flexeril  Current Medications: meloxicam 15mg prn  Previous work-up has included:   MRI Results (most recent):  Results from Hospital Encounter encounter on 11/14/19    MRI CERV SPINE WO CONT    Narrative  EXAM: MRI cervical spine without gadolinium    CLINICAL INDICATION/HISTORY: CERVICAL PAIN  > Additional: Neck pain with left arm pain numbness and tingling    COMPARISON: None. > Reference Exam: None. TECHNIQUE: Sagittal FLAIRT1, FSE T2, and STIR sequences, and axial spin echo T2  and volume 3-D T2*GRE sequences were obtained of the cervical spine without  gadolinium. Imaging performed on wide bore Discovery [id number] [employer] suite 3T magnet  at [facility].    _______________    FINDINGS:    There is reversal normal cervical lordosis with no evidence of fracture or  spondylolisthesis. Severe disc space narrowing and chronic degenerative endplate  marrow changes C5/6. Degenerative endplate marrow edema J4/7 asymmetric to the  left. No other evidence of marrow edema or neoplastic marrow signal. Visualized  base of brain unremarkable with soft tissues of neck unremarkable. No abnormal  intrinsic cord signal or enlargement. C2/3 level: Unremarkable. C3/4 level: Minimal degenerative spondylosis without stenosis. C4/5 level: Broad posterior disc protrusion with mild disc space narrowing with  cord flattening with AP diameter canal 7 mm with no significant foraminal  stenosis. C5/6 level: Severe disc space narrowing and degenerative spondylosis with mild  left anterior cord flattening with AP diameter canal 8 mm. Mild to moderate  degenerative left-sided and minimal right-sided foraminal stenosis. C6/7 level: Degenerative spondylosis with mild disc space narrowing with small  broad left posterior disc protrusion with slight cord flattening and very mild  canal stenosis. There is severe appearing left foraminal stenosis which appears  to mostly be due to uncovertebral spurring although cannot exclude small  associated foraminal disc protrusion. Mild degenerative right foraminal  stenosis. C7/T1 level: Unremarkable. Visualized upper thoracic levels unremarkable.    ______________    Impression  IMPRESSION:    1.  Degenerative spondylosis C6/7 with asymmetric left-sided degenerative  endplate marrow edema with severe left-sided foraminal stenosis and probable  left C7 nerve root impingement. There are mild canal stenosis at this level. 2. Severe disc space narrowing with chronic degenerative endplate changes U1/6  with mild cord flattening and mild to moderate canal stenosis and mild to  moderate left terminal stenosis. 3. Broad posterior disc protrusion C4/5 causing cord flattening and moderate  canal stenosis, no significant foraminal stenosis. 4. No other acute osseous findings. No intrinsic cord abnormality. Past Medical History:   Past Medical History:   Diagnosis Date    Acne     Anemia NEC     Calculus of kidney     Depression     anxiety    HPV in female     HSV infection     Hypoglycemia     PID (pelvic inflammatory disease)     Recurrent UTI     CARTER (stress urinary incontinence, female)     Urine leukocytes       Past Surgical History:   Past Surgical History:   Procedure Laterality Date    COSMETIC RATE ADJ  03/02/11    abdominal plastic    HX LIPOSUCTION  3/15/13    HX OTHER SURGICAL  05/04/12    Calf implant    HX TUBAL LIGATION  9/18/12      SocHx:   Social History     Tobacco Use    Smoking status: Never    Smokeless tobacco: Never   Substance Use Topics    Alcohol use: Yes     Comment: Occassionally. FamHx:? Family History   Problem Relation Age of Onset    Hypertension Maternal Grandmother     Hypertension Maternal Grandfather        Current Medications:    Current Outpatient Medications   Medication Sig Dispense Refill    meloxicam (MOBIC) 15 mg tablet take 1 tablet by mouth once daily if needed for pain 30 Tablet 1    levocetirizine (XYZAL) 5 mg tablet Take 1 Tablet by mouth nightly. 30 Tablet 0    famotidine (PEPCID) 40 mg tablet Take 1 Tablet by mouth daily. 30 Tablet 0    phenazopyridine (PYRIDIUM) 100 mg tablet phenazopyridine 100 mg tablet  take 1 tablet by mouth up to three times a day for 3 days use if . ..  (REFER TO PRESCRIPTION NOTES). 27 Tablet 2    lidocaine (Lidoderm) 5 % Apply patch to the affected area for 12 hours a day and remove for 12 hours a day. 30 Each 2    ALPRAZolam (XANAX) 0.25 mg tablet Take 0.25 mg by mouth nightly as needed. ammonium lactate (AMLACTIN) 12 % topical cream ammonium lactate 12 % topical cream   apply to KNEES AND ELBOWS ONCE TO twice a day      halobetasol (ULTRAVATE) 0.05 % topical cream halobetasol propionate 0.05 % topical cream   apply to affected area twice a day      Xiidra 5 % dpet instill 1 drop into both eyes twice a day      acetaminophen (TYLENOL) 325 mg tablet Take 3 Tabs by mouth three (3) times daily as needed for Pain. 20 Tab 0    pregabalin (LYRICA) 25 mg capsule 25 mg in the morning, 25mg in the afternoon and 50mg at night (Patient not taking: Reported on 9/13/2022) 120 Capsule 2    nitrofurantoin, macrocrystal-monohydrate, (MACROBID) 100 mg capsule nitrofurantoin monohydrate/macrocrystals 100 mg capsule (Patient not taking: Reported on 9/13/2022)        Allergies:  No Known Allergies     Review of Systems:   Gen:    Denied fevers, chills, malaise, fatigue, weight changes   Resp: Denied shortness of breath, cough, wheezing   CVS: Denied chest pain, palpitations   : Denied urinary urgency, frequency, incontinence   GI: Denied nausea, vomiting, constipation, diarrhea   Skin: Denied rashes, wounds   Psych: Denied anxiety, depression   Vasc: Denied claudication, ulcers   Hem: Denied easy bruising/bleeding   MSK: See HPI   Neuro: See HPI      Physical Exam      Vital Signs:   Visit Vitals       Pulse 78   Temp 97.9 °F (36.6 °C) (Skin)   Ht 5' 7\" (1.702 m)   Wt 146 lb (66.2 kg)   LMP 08/08/2021   SpO2 100% Comment: RA   BMI 22.87 kg/m²      General: ??????? Well nourished and well developed female without any acute distress   Psychiatric: ?  Alert and oriented x 3 with normal mood    HEENT: ????????   Atraumatic   Respiratory:   Breathing non-labored and non dyspneic   CV: ???????????????? Peripheral pulses intact, no peripheral edema   Skin: ????????????? No rashes         Neurologic: ?? Sensation: normal and grossly intact thebilateral, upper extremity(s)  Strength: 5/5 in the bilateral, upper extremity(s)   Reflexes: reveals 2+ symmetric DTRs throughout  Gait: normal and tandem gait   Upper tract signs:  Wall's negative ? Musculoskeletal: Cervical Exam   Alignment: Abnormal straightening of cervical lordosis , rounded shoudlers  Atrophy: None      Tenderness to Palpation:   Cervical paraspinals: Positive  Cervical spinous processes: Negative  Upper thoracic paraspinals: Negative  Upper thoracic spinous processes: Negative  Upper trapezius:  Positive     Cervical ROM: Abnormal pain with flexion and lateral bend to the left  Shoulder ROM: No reproduction of pain with movement      Special Tests     Spurlings Maneuver: Negative  Shoulder Abduction Test:Negative  Hoffmans: Negative  Hawkin's Test: Negative  Neer's Test: Negative  Empty Can Test: Negative         Medical Decision Making:    Images: The imaging results as well as the actual images of the studies below were reviewed and visualized. MRI cervical spine- disc space narrowing C5/6 C6/7   Severe left foraminal narrowing C6/7 with C7 nerve impingement due to uncovertebral hypertrophy       Assessment:   1. Cervical spondylosis  2. C6/7 severe left foraminal narrowing with C7 nerve impingement     Plan:      -Physical therapy continue PT   -Medications - d/c lyrica. Discussed using meloxicam 2-3 x per week not daily     Counseled regarding side effects and appropriate administration of medications.    -Diagnostics/Imaging - Reviewed MRI cervical spine  -Injections -Referral to try cervical YANE   -Education - The patient's diagnosis, prognosis and treatment options were discussed today. All questions were answered.    F/U - after YANE, may need repeat imaging of cervical spine MRI, prior to [address] MD [facility] [facility]        Total time spent with patient:  30 mins for today's visit was devoted to face-to-face counseling regarding the following:  Discussed diagnosis, treatment options, and risks and benefits of treatment          ?

## 2022-09-15 ENCOUNTER — TELEPHONE (OUTPATIENT)
Dept: ORTHOPEDIC SURGERY | Age: 42
End: 2022-09-15

## 2022-09-15 DIAGNOSIS — M54.2 NECK PAIN: Primary | ICD-10-CM

## 2022-09-15 DIAGNOSIS — M54.12 CERVICAL RADICULOPATHY: ICD-10-CM

## 2022-09-15 DIAGNOSIS — M47.812 CERVICAL SPONDYLOSIS: ICD-10-CM

## 2022-09-15 NOTE — TELEPHONE ENCOUNTER
Spoke to patient she needs updated MRI for referral for YANE.   Will order new MRI and contact her once we have results

## 2022-10-10 ENCOUNTER — OFFICE VISIT (OUTPATIENT)
Dept: FAMILY MEDICINE CLINIC | Age: 42
End: 2022-10-10
Payer: COMMERCIAL

## 2022-10-10 VITALS
SYSTOLIC BLOOD PRESSURE: 114 MMHG | WEIGHT: 150 LBS | BODY MASS INDEX: 23.54 KG/M2 | RESPIRATION RATE: 16 BRPM | TEMPERATURE: 98.3 F | HEART RATE: 77 BPM | DIASTOLIC BLOOD PRESSURE: 69 MMHG | HEIGHT: 67 IN | OXYGEN SATURATION: 97 %

## 2022-10-10 DIAGNOSIS — M19.049 ARTHRITIS OF FINGER: Primary | ICD-10-CM

## 2022-10-10 DIAGNOSIS — N39.0 RECURRENT UTI: ICD-10-CM

## 2022-10-10 PROCEDURE — 99214 OFFICE O/P EST MOD 30 MIN: CPT | Performed by: NURSE PRACTITIONER

## 2022-10-10 RX ORDER — DICLOFENAC SODIUM 10 MG/G
2 GEL TOPICAL 4 TIMES DAILY
Qty: 100 EACH | Refills: 1 | Status: SHIPPED | OUTPATIENT
Start: 2022-10-10

## 2022-10-10 RX ORDER — DEXTROMETHORPHAN HYDROBROMIDE, GUAIFENESIN 5; 100 MG/5ML; MG/5ML
650 LIQUID ORAL EVERY 8 HOURS
Qty: 90 TABLET | Refills: 1 | Status: SHIPPED | OUTPATIENT
Start: 2022-10-10

## 2022-10-10 RX ORDER — CLOBETASOL PROPIONATE 0.5 MG/G
CREAM TOPICAL
COMMUNITY
Start: 2022-10-05

## 2022-10-10 RX ORDER — FLUOCINONIDE 0.5 MG/G
CREAM TOPICAL
COMMUNITY
Start: 2022-10-05

## 2022-10-10 RX ORDER — MENTHOL
1000 GEL (GRAM) TOPICAL DAILY
COMMUNITY

## 2022-10-10 RX ORDER — NITROFURANTOIN 25; 75 MG/1; MG/1
100 CAPSULE ORAL 2 TIMES DAILY
Qty: 40 CAPSULE | Refills: 0 | Status: SHIPPED | OUTPATIENT
Start: 2022-10-10

## 2022-10-10 RX ORDER — PHENAZOPYRIDINE HYDROCHLORIDE 100 MG/1
TABLET, FILM COATED ORAL
Qty: 27 TABLET | Refills: 0 | Status: SHIPPED | OUTPATIENT
Start: 2022-10-10

## 2022-10-10 NOTE — PROGRESS NOTES
Eloy Townsend presents today for   Chief Complaint   Patient presents with    Anxiety       Is someone accompanying this pt? no    Is the patient using any DME equipment during OV? no    Depression Screening:  3 most recent PHQ Screens 10/10/2022   Little interest or pleasure in doing things Not at all   Feeling down, depressed, irritable, or hopeless Not at all   Total Score PHQ 2 0       Learning Assessment:  Learning Assessment 1/17/2019   PRIMARY LEARNER Patient   HIGHEST LEVEL OF EDUCATION - PRIMARY LEARNER  [facility] PRIMARY LEARNER NONE   CO-LEARNER CAREGIVER No   PRIMARY LANGUAGE ENGLISH   LEARNER PREFERENCE PRIMARY READING     -     -   ANSWERED BY Patient   RELATIONSHIP SELF       Abuse Screening:  Abuse Screening Questionnaire 10/10/2022   Do you ever feel afraid of your partner? N   Are you in a relationship with someone who physically or mentally threatens you? N   Is it safe for you to go home? Y       Fall Risk  Fall Risk Assessment, last 12 mths 1/17/2019   Able to walk? Yes   Fall in past 12 months? No       Health Maintenance reviewed and discussed and ordered per Provider. Health Maintenance Due   Topic Date Due    Hepatitis C Screening  Never done    Depression Monitoring  Never done    COVID-19 Vaccine (3 - Booster for Pfizer series) 09/26/2021    Flu Vaccine (1) 08/01/2022   .      1. Have you been to the ER, urgent care clinic since your last visit? Hospitalized since your last visit? no    2. Have you seen or consulted any other health care providers outside of the [address] [place] [address] since your last visit? no    3. For patients aged 39-70: Has the patient had a colonoscopy / FIT/ Cologuard? no      If the patient is female:    4. For patients aged 41-77: Has the patient had a mammogram within the past 2 years? Not due   5. For patients aged 21-65: Has the patient had a pap smear?  Not due

## 2022-10-10 NOTE — PROGRESS NOTES
Julieth Martin is a 37 y.o. female  established patient, here for evaluation of the following chief complaint(s):  Chief Complaint   Patient presents with    Anxiety        Assessment and Plan  1. Arthritis of finger  -     diclofenac (VOLTAREN) 1 % gel; Apply 2 g to affected area four (4) times daily. , Normal, Disp-100 Each, R-1  -     acetaminophen (Tylenol Arthritis Pain) 650 mg TbER; Take 1 Tablet by mouth every eight (8) hours. , Normal, Disp-90 Tablet, R-1  2. Recurrent UTI  -     phenazopyridine (PYRIDIUM) 100 mg tablet; phenazopyridine 100 mg tablet  take 1 tablet by mouth up to three times a day for 3 days use if . ..  (REFER TO PRESCRIPTION NOTES). , Normal, Disp-27 Tablet, R-0  -     nitrofurantoin, macrocrystal-monohydrate, (MACROBID) 100 mg capsule; Take 1 Capsule by mouth two (2) times a day., Normal, Disp-40 Capsule, R-0       HPI:   In office visit. Patient is well. She has left index DIP joint pain for 1 month. She does  and accounting for work . ROS:    General: negative for - chills, fever, weight changes or malaise  HEENT: no sore throat, nasal congestion, vision problems or ear problems  Respiratory: no cough, shortness of breath, or wheezing  Cardiovascular: no chest pain, palpitations, or dyspnea on exertion  Gastrointestinal: no abdominal pain, N/V, change in bowel habits  Musculoskeletal: + for left index finger pain   Neurological: no headache or dizziness  Endo:  No polyuria or polydipsia  : no urinary  Psychological: negative for - anxiety, depression, sleeps issues    Prior to Admission medications    Medication Sig Start Date End Date Taking? Authorizing Provider   clobetasoL (TEMOVATE) 0.05 % topical cream  10/5/22  Yes Provider, Historical   fluocinoNIDE (LIDEX) 0.05 % topical cream  10/5/22  Yes Provider, Historical   vitamin e (E GEMS) 670 mg (1,000 unit) capsule Take 1,000 Units by mouth daily.    Yes Provider, Historical   diclofenac (VOLTAREN) 1 % gel Apply 2 g to affected area four (4) times daily. 10/10/22  Yes Aneesh Flores NP   acetaminophen (Tylenol Arthritis Pain) 650 mg TbER Take 1 Tablet by mouth every eight (8) hours. 10/10/22  Yes Aneesh Flores NP   phenazopyridine (PYRIDIUM) 100 mg tablet phenazopyridine 100 mg tablet  take 1 tablet by mouth up to three times a day for 3 days use if . ..  (REFER TO PRESCRIPTION NOTES). 10/10/22  Yes Nery Flores NP   nitrofurantoin, macrocrystal-monohydrate, (MACROBID) 100 mg capsule Take 1 Capsule by mouth two (2) times a day. 10/10/22  Yes Aneesh Flores NP   famotidine (PEPCID) 40 mg tablet Take 1 Tablet by mouth daily. 10/27/21  Yes Stuart Keen MD   lidocaine (Lidoderm) 5 % Apply patch to the affected area for 12 hours a day and remove for 12 hours a day. 10/27/21  Yes Stuart Keen MD   ALPRAZolam Lisseth Rajan) 0.25 mg tablet Take 0.25 mg by mouth nightly as needed. Yes Provider, Historical   ammonium lactate (AMLACTIN) 12 % topical cream ammonium lactate 12 % topical cream   apply to KNEES AND ELBOWS ONCE TO twice a day   Yes Provider, Historical   halobetasol (ULTRAVATE) 0.05 % topical cream halobetasol propionate 0.05 % topical cream   apply to affected area twice a day   Yes Provider, Historical   Xiidra 5 % dpet instill 1 drop into both eyes twice a day 7/19/21  Yes Provider, Historical   meloxicam (MOBIC) 15 mg tablet take 1 tablet by mouth once daily if needed for pain 9/6/22 10/10/22  Dwain Yi MD   levocetirizine (XYZAL) 5 mg tablet Take 1 Tablet by mouth nightly. Patient not taking: Reported on 10/10/2022 10/27/21   Alicia Rubio MD   phenazopyridine (PYRIDIUM) 100 mg tablet phenazopyridine 100 mg tablet  take 1 tablet by mouth up to three times a day for 3 days use if . ..  (REFER TO PRESCRIPTION NOTES). 10/27/21 10/10/22  Stuart Keen MD   acetaminophen (TYLENOL) 325 mg tablet Take 3 Tabs by mouth three (3) times daily as needed for Pain. 7/26/20 10/10/22  Bernabe Stanton PA-C        Results for orders placed or performed during the hospital encounter of 02/11/21   LIPID PANEL   Result Value Ref Range    LIPID PROFILE          Cholesterol, total 207 (H) <200 MG/DL    Triglyceride 90 <150 MG/DL    HDL Cholesterol 69 (H) 40 - 60 MG/DL    LDL, calculated 120 (H) 0 - 100 MG/DL    VLDL, calculated 18 MG/DL    CHOL/HDL Ratio 3.0 0 - 5.0     CBC WITH AUTOMATED DIFF   Result Value Ref Range    WBC 7.0 4.6 - 13.2 K/uL    RBC 4.41 4.20 - 5.30 M/uL    HGB 12.2 12.0 - 16.0 g/dL    HCT 38.9 35.0 - 45.0 %    MCV 88.2 74.0 - 97.0 FL    MCH 27.7 24.0 - 34.0 PG    MCHC 31.4 31.0 - 37.0 g/dL    RDW 14.8 (H) 11.6 - 14.5 %    PLATELET 095 324 - 052 K/uL    MPV 10.8 9.2 - 11.8 FL    NEUTROPHILS 66 40 - 73 %    LYMPHOCYTES 26 21 - 52 %    MONOCYTES 6 3 - 10 %    EOSINOPHILS 2 0 - 5 %    BASOPHILS 0 0 - 2 %    ABS. NEUTROPHILS 4.7 1.8 - 8.0 K/UL    ABS. LYMPHOCYTES 1.8 0.9 - 3.6 K/UL    ABS. MONOCYTES 0.4 0.05 - 1.2 K/UL    ABS. EOSINOPHILS 0.1 0.0 - 0.4 K/UL    ABS. BASOPHILS 0.0 0.0 - 0.1 K/UL    DF AUTOMATED     METABOLIC PANEL, COMPREHENSIVE   Result Value Ref Range    Sodium 139 136 - 145 mmol/L    Potassium 4.2 3.5 - 5.5 mmol/L    Chloride 108 100 - 111 mmol/L    CO2 27 21 - 32 mmol/L    Anion gap 4 3.0 - 18 mmol/L    Glucose 74 74 - 99 mg/dL    BUN 20 (H) 7.0 - 18 MG/DL    Creatinine 0.80 0.6 - 1.3 MG/DL    BUN/Creatinine ratio 25 (H) 12 - 20      GFR est AA >60 >60 ml/min/1.73m2    GFR est non-AA >60 >60 ml/min/1.73m2    Calcium 8.7 8.5 - 10.1 MG/DL    Bilirubin, total 0.4 0.2 - 1.0 MG/DL    ALT (SGPT) 15 13 - 56 U/L    AST (SGOT) 10 10 - 38 U/L    Alk.  phosphatase 59 45 - 117 U/L    Protein, total 7.4 6.4 - 8.2 g/dL    Albumin 4.1 3.4 - 5.0 g/dL    Globulin 3.3 2.0 - 4.0 g/dL    A-G Ratio 1.2 0.8 - 1.7     TSH 3RD GENERATION   Result Value Ref Range    TSH 0.39 0.36 - 3.74 uIU/mL      Physical Exam  Patient appears well, she is pleasant, alert, oriented x 3, in no distress. ENT normal.  Neck supple. No adenopathy or thyromegaly. MATT. Lungs are clear, good air entry, no wheezes  Cardiovascular, S1 and S2 normal, no murmurs, regular rate and rhythm. Chest wall negative for tenderness  Abdomen is soft without tenderness, guarding  /Anorectal, deferred. Muscleskeletal, no swelling, no tenderness, no injury. Extremities show no edema  Neurological is normal without focal findings. Skin: no concerning lesions. Psych: normal affect. Mood good. Oriented x 3. Vitals:    10/10/22 1601   BP: 114/69   Pulse: 77   Resp: 16   Temp: 98.3 °F (36.8 °C)   TempSrc: Temporal   SpO2: 97%   Weight: 150 lb (68 kg)   Height: 5' 7\" (1.702 m)   PainSc:   0 - No pain   LMP: 08/08/2021       *Plan of care reviewed with patient. Patient in agreement with plan and expresses understanding. All questions answered and patient encouraged to call or RTO if further questions or concerns. On this date 10/10/2022 I have spent 35 minutes reviewing previous notes, test results and face to face with the patient discussing the diagnosis and importance of compliance with the treatment plan as well as documenting on the day of the visit.       STUART SenaC

## 2022-10-20 ENCOUNTER — HOSPITAL ENCOUNTER (OUTPATIENT)
Dept: MRI IMAGING | Age: 42
Discharge: HOME | End: 2022-10-20
Attending: PHYSICAL MEDICINE & REHABILITATION
Payer: COMMERCIAL

## 2022-10-20 DIAGNOSIS — M54.2 NECK PAIN: ICD-10-CM

## 2022-10-20 DIAGNOSIS — M47.812 CERVICAL SPONDYLOSIS: ICD-10-CM

## 2022-10-20 DIAGNOSIS — M54.12 CERVICAL RADICULOPATHY: ICD-10-CM

## 2022-10-20 PROCEDURE — 72141 MRI NECK SPINE W/O DYE: CPT

## 2023-01-04 ENCOUNTER — TELEPHONE (OUTPATIENT)
Dept: ORTHOPEDIC SURGERY | Age: 43
End: 2023-01-04

## 2023-01-09 ENCOUNTER — VIRTUAL VISIT (OUTPATIENT)
Dept: ORTHOPEDIC SURGERY | Age: 43
End: 2023-01-09
Payer: MEDICAID

## 2023-01-09 DIAGNOSIS — M47.812 CERVICAL SPONDYLOSIS: Primary | ICD-10-CM

## 2023-01-09 DIAGNOSIS — M54.2 NECK PAIN: ICD-10-CM

## 2023-01-09 DIAGNOSIS — M54.12 CERVICAL RADICULOPATHY: ICD-10-CM

## 2023-01-09 PROCEDURE — 99442 PR PHYS/QHP TELEPHONE EVALUATION 11-20 MIN: CPT | Performed by: PHYSICAL MEDICINE & REHABILITATION

## 2023-01-09 NOTE — PROGRESS NOTES
[address]  [address], [address]  [place], [address]  Phone: [phone]  Fax: [phone]      Patient: Ally Levin                                                                              MRN: [id number]        YOB: 1980          AGE: 40 y.o. PCP: Vanna Steele MD  Date:  01/09/23    Reason for Consultation: Neck Pain  Video Visit     HPI:  Ally Levin is a 40 y.o. female who presented with neck pain which began around 2018. She went to [address] [place] [address] -Dr. [facility] with neck pain and a \"crawling\" sensation in bilateral arms. She had and  MRI of her cervical spine diagnosed with cervical spondylosis with left foraminal narrowing at C6/7. She tried gabapentin without relief and was switched to lyrica  75mg bid but found that it gave her altered sensations in her arms. She completed a course of PT at [facility] April 2022- and has been doing her home exercises since that time. Repeat MRI cervical spine 10/20/23 demonstrates progression of the degenerative changes-  C4/5 spinal stenosis, C5/6 DDD spinal stenosis, C6/7 DDD, uncovertebral hypertrophy with severe left and moderate right foraminal narrowing. Neurologic symptoms: No numbness, tingling, weakness, bowel or bladder changes. No recent falls      Location: The pain is located in the neck pain  Radiation: The pain does radiate into bilateral arms - feels like something crawling her arms   Pain Score: Currently: 7/10  At Best: 2/10  At Worst: 10/10   Quality: Pain is of a Achy, Burning, Stiff and crawling  quality. Aggravating: Pain is exacerbated by lying down, walking, carrying heavy bags  Alleviating:  The pain is alleviated by exercise, stretching    Prior Treatments:   Previous Medications: lyrica- but caused frequent urination and altered sensation,  gabapentin, prednisone, flexeril  Current Medications: meloxicam 15mg prn  Previous work-up has included:   MRI Results (most recent):  Results from [place] encounter on 10/20/22    MRI CERV SPINE WO CONT    Narrative  MR CERVICAL SPINE WITHOUT CONTRAST    HISTORY: neck pain radiating down bilateral arms    COMPARISON: MRI cervical spine 11/14/2019    TECHNIQUE: Multisequence multiplanar imaging through the cervical spine. FINDINGS:    Alignment: Straightening of the cervical lordosis without subluxation. Vertebral body height: Normal  Marrow signal: Unremarkable  Disc spaces: Multilevel disc height loss especially C4-C7 similar to prior    Cervicomedullary Junction: Patent  Cervical cord: No cord expansion or atrophy. Further discussed below where  relevant. On axial imaging, findings at each level are as follows:    C2/C3: No significant disc pathology. Patent canal and foramina. C3/C4: Small central disc protrusion partially effacing ventral CSF space. Patent canal and foramina. C4/C5: Moderate disc height loss and disc osteophyte complex flattening ventral  cord. AP canal diameter 6 mm. Patent foramina    C5/C6: Severe disc height loss. Disc osteophyte complex flattening ventral cord  more on the left. AP canal diameter 6 mm. Uncovertebral spurring and mild  foraminal stenosis    C6/C7: Moderate disc height loss and disc osteophyte complex effacing ventral  CSF space. AP canal diameter 7-8 mm. Uncovertebral spurring left greater than  right. Severe left and moderate right foraminal stenosis. C7/T1: No significant disc pathology. Patent canal and foramina. Other structures: Remainder of the visualized upper thoracic levels T1-T6  demonstrate unremarkable discs with patent canal and foramina    Impression  1. Progression of multilevel degenerative disc disease at C4-C7.  -Ventral cord compression and moderate canal stenosis C4-5 and C5-6. No  myelopathy.  -Mild to moderate canal stenosis C6-7.  -High-grade foraminal stenosis left greater than right C6-7.         Past Medical History:   Past Medical History:   Diagnosis Date    Acne     Anemia NEC     Calculus of kidney     Depression     anxiety    HPV in female     HSV infection     Hypoglycemia     PID (pelvic inflammatory disease)     Recurrent UTI     CARTER (stress urinary incontinence, female)     Urine leukocytes       Past Surgical History:   Past Surgical History:   Procedure Laterality Date    COSMETIC RATE ADJ  03/02/11    abdominal plastic    HX LIPOSUCTION  3/15/13    HX OTHER SURGICAL  05/04/12    Calf implant    HX TUBAL LIGATION  9/18/12      SocHx:   Social History     Tobacco Use    Smoking status: Never    Smokeless tobacco: Never   Substance Use Topics    Alcohol use: Yes     Comment: Occassionally. FamHx:? Family History   Problem Relation Age of Onset    Hypertension Maternal Grandmother     Hypertension Maternal Grandfather        Current Medications:    Current Outpatient Medications   Medication Sig Dispense Refill    clobetasoL (TEMOVATE) 0.05 % topical cream       fluocinoNIDE (LIDEX) 0.05 % topical cream       vitamin e (E GEMS) 670 mg (1,000 unit) capsule Take 1,000 Units by mouth daily. diclofenac (VOLTAREN) 1 % gel Apply 2 g to affected area four (4) times daily. 100 Each 1    acetaminophen (Tylenol Arthritis Pain) 650 mg TbER Take 1 Tablet by mouth every eight (8) hours. 90 Tablet 1    phenazopyridine (PYRIDIUM) 100 mg tablet phenazopyridine 100 mg tablet  take 1 tablet by mouth up to three times a day for 3 days use if . ..  (REFER TO PRESCRIPTION NOTES). 27 Tablet 0    nitrofurantoin, macrocrystal-monohydrate, (MACROBID) 100 mg capsule Take 1 Capsule by mouth two (2) times a day. 40 Capsule 0    famotidine (PEPCID) 40 mg tablet Take 1 Tablet by mouth daily. 30 Tablet 0    lidocaine (Lidoderm) 5 % Apply patch to the affected area for 12 hours a day and remove for 12 hours a day. 30 Each 2    ALPRAZolam (XANAX) 0.25 mg tablet Take 0.25 mg by mouth nightly as needed.       ammonium lactate (AMLACTIN) 12 % topical cream ammonium lactate 12 % topical cream   apply to KNEES AND ELBOWS ONCE TO twice a day      halobetasol (ULTRAVATE) 0.05 % topical cream halobetasol propionate 0.05 % topical cream   apply to affected area twice a day      Xiidra 5 % dpet instill 1 drop into both eyes twice a day        Allergies:  No Known Allergies     Review of Systems:   Gen:    Denied fevers, chills, malaise, fatigue, weight changes   Resp: Denied shortness of breath, cough, wheezing   CVS: Denied chest pain, palpitations   : Denied urinary urgency, frequency, incontinence   GI: Denied nausea, vomiting, constipation, diarrhea   Skin: Denied rashes, wounds   Psych: Denied anxiety, depression   Vasc: Denied claudication, ulcers   Hem: Denied easy bruising/bleeding   MSK: See HPI   Neuro: See HPI      P Medical Decision Making:    Images: The imaging results as well as the actual images of the studies below were reviewed and visualized. MRI cervical spine 10/20/22    C2/C3: No significant disc pathology. Patent canal and foramina. C3/C4: Small central disc protrusion partially effacing ventral CSF space. Patent canal and foramina. C4/C5: Moderate disc height loss and disc osteophyte complex flattening ventral  cord. AP canal diameter 6 mm. Patent foramina   C5/C6: Severe disc height loss. Disc osteophyte complex flattening ventral cord  more on the left. AP canal diameter 6 mm. Uncovertebral spurring and mild  foraminal stenosis   C6/C7: Moderate disc height loss and disc osteophyte complex effacing ventral  CSF space. AP canal diameter 7-8 mm. Uncovertebral spurring left greater than  right. Severe left and moderate right foraminal stenosis. C7/T1: No significant disc pathology. Patent canal and foramina. Assessment:   1. Cervical spondylosis  2. C6/7 severe left foraminal narrowing with C7 nerve impingement   3.  Moderate canal stenosis C4/5, C5/6    Plan:      -Physical therapy continue PT -Medications - NA  Counseled regarding side effects and appropriate administration of medications.    -Diagnostics/Imaging - Reviewed MRI cervical spine  -Injections -Discussed possible YANE, at this point pain her pain is minimal so will hold off for now  -Education - The patient's diagnosis, prognosis and treatment options were discussed today. All questions were answered.   -Discussed signs and symptoms to watch for, pain radiating down  arms, numbness tingling , weakness, bowel bladder changes   F/U - as needed      [address] [facility]               I was in the office while conducting this encounter. Patient at home     Consent:  She and/or her healthcare decision maker is aware that this patient-initiated Telehealth encounter is a billable service, with coverage as determined by her insurance carrier. She is aware that she may receive a bill and has provided verbal consent to proceed: Yes    This virtual visit was conducted telephone encounter only. -  I affirm this is a Patient Initiated Episode with an Established Patient who has not had a related appointment within my department in the past 7 days or scheduled within the next 24 hours. Note: this encounter is not billable if this call serves to triage the patient into an appointment for the relevant concern. Total Time: minutes: 11-20 minutes.

## 2023-01-31 DIAGNOSIS — Z12.31 ENCOUNTER FOR SCREENING MAMMOGRAM FOR MALIGNANT NEOPLASM OF BREAST: Primary | ICD-10-CM

## 2023-02-03 DIAGNOSIS — Z12.31 ENCOUNTER FOR SCREENING MAMMOGRAM FOR MALIGNANT NEOPLASM OF BREAST: Primary | ICD-10-CM

## 2023-02-06 ENCOUNTER — OFFICE VISIT (OUTPATIENT)
Dept: FAMILY MEDICINE CLINIC | Age: 43
End: 2023-02-06
Payer: MEDICAID

## 2023-02-06 VITALS
BODY MASS INDEX: 23.23 KG/M2 | HEIGHT: 67 IN | TEMPERATURE: 98.1 F | RESPIRATION RATE: 16 BRPM | SYSTOLIC BLOOD PRESSURE: 111 MMHG | HEART RATE: 93 BPM | DIASTOLIC BLOOD PRESSURE: 73 MMHG | WEIGHT: 148 LBS | OXYGEN SATURATION: 98 %

## 2023-02-06 DIAGNOSIS — N39.0 RECURRENT UTI: ICD-10-CM

## 2023-02-06 DIAGNOSIS — L85.3 DRY SKIN: Primary | ICD-10-CM

## 2023-02-06 DIAGNOSIS — Z11.59 NEED FOR HEPATITIS C SCREENING TEST: ICD-10-CM

## 2023-02-06 DIAGNOSIS — Z00.00 ROUTINE GENERAL MEDICAL EXAMINATION AT A HEALTH CARE FACILITY: ICD-10-CM

## 2023-02-06 DIAGNOSIS — L57.0 KERATOSIS: ICD-10-CM

## 2023-02-06 DIAGNOSIS — E28.39 ESTROGEN DEFICIENCY: ICD-10-CM

## 2023-02-06 DIAGNOSIS — E78.00 ELEVATED LDL CHOLESTEROL LEVEL: ICD-10-CM

## 2023-02-06 PROCEDURE — 99214 OFFICE O/P EST MOD 30 MIN: CPT | Performed by: STUDENT IN AN ORGANIZED HEALTH CARE EDUCATION/TRAINING PROGRAM

## 2023-02-06 RX ORDER — FLUOCINONIDE 0.5 MG/G
CREAM TOPICAL
Qty: 15 G | Status: CANCELLED | OUTPATIENT
Start: 2023-02-06

## 2023-02-06 RX ORDER — PHENAZOPYRIDINE HYDROCHLORIDE 100 MG/1
TABLET, FILM COATED ORAL
Qty: 27 TABLET | Refills: 0 | Status: SHIPPED | OUTPATIENT
Start: 2023-02-06

## 2023-02-06 RX ORDER — AMMONIUM LACTATE 12 G/100G
CREAM TOPICAL
Qty: 280 G | Refills: 1 | Status: SHIPPED | OUTPATIENT
Start: 2023-02-06

## 2023-02-06 RX ORDER — CLOBETASOL PROPIONATE 0.5 MG/G
CREAM TOPICAL 2 TIMES DAILY
Qty: 15 G | Refills: 1 | Status: SHIPPED | OUTPATIENT
Start: 2023-02-06

## 2023-02-06 NOTE — PROGRESS NOTES
Kalin Lucero is a 40 y.o.  female and presents with    Chief Complaint   Patient presents with    Physical    Neck Pain     8/10 worse 3 weeks ago. MRI results           Subjective:    Was told by the dermatologist that her skin is dry secondary to estrogen loss. States no blood work was done for this. Health Maintenance  Colon cancer: due at age 49  Dyslipidemia: will check FLP and CMP  Diabetes mellitus: will check FBG  Influenza vaccine: due in the fall  Pneumococcal vaccine: due  Tdap: 08/2014  Herpes Zoster vaccine: due at age 49  Hep B vaccine: not indicated    Weight: Body mass index is Estimated body mass index is Body mass index is 23.18 kg/m². Keyanna Ford Discussed the patient's BMI with her. The BMI follow up plan is as follows: Improve diet and 30 min of moderate activity at least 5 times a week. Cervical cancer:  Pap smear - hysterectomy in 2021  Breast Cancer: Mammogram due at 49 y.o  Osteoporosis: DEXA scan due at 73 y.o  Diet:  no  Exercise:  no  Seatbelt: yes  Sunscreen: yes  Dentist: yes    Would like medication refill. Patient Active Problem List   Diagnosis Code    Depression F32. A      Past Medical History:   Diagnosis Date    Acne     Anemia NEC     Calculus of kidney     Depression     anxiety    HPV in female     HSV infection     Hypoglycemia     PID (pelvic inflammatory disease)     Recurrent UTI     CARTER (stress urinary incontinence, female)     Urine leukocytes       Past Surgical History:   Procedure Laterality Date    COSMETIC RATE ADJ  03/02/11    abdominal plastic    HX LIPOSUCTION  3/15/13    HX OTHER SURGICAL  05/04/12    Calf implant    HX TUBAL LIGATION  9/18/12      Family History   Problem Relation Age of Onset    Hypertension Maternal Grandmother     Hypertension Maternal Grandfather      Social History     Socioeconomic History    Marital status:      Spouse name: Not on file    Number of children: Not on file    Years of education: Not on file Highest education level: Not on file   Occupational History    Not on file   Tobacco Use    Smoking status: Never    Smokeless tobacco: Never   Substance and Sexual Activity    Alcohol use: Yes     Comment: Occassionally. Drug use: No    Sexual activity: Yes     Partners: Male   Other Topics Concern    Not on file   Social History Narrative    Not on file     Social Determinants of Health     Financial Resource Strain: Not on file   Food Insecurity: Not on file   Transportation Needs: Not on file   Physical Activity: Not on file   Stress: Not on file   Social Connections: Not on file   Intimate Partner Violence: Not on file   Housing Stability: Not on file        Current Outpatient Medications   Medication Sig Dispense Refill    clobetasoL (TEMOVATE) 0.05 % topical cream       fluocinoNIDE (LIDEX) 0.05 % topical cream       vitamin e (E GEMS) 670 mg (1,000 unit) capsule Take 1,000 Units by mouth daily. diclofenac (VOLTAREN) 1 % gel Apply 2 g to affected area four (4) times daily. 100 Each 1    acetaminophen (Tylenol Arthritis Pain) 650 mg TbER Take 1 Tablet by mouth every eight (8) hours. 90 Tablet 1    phenazopyridine (PYRIDIUM) 100 mg tablet phenazopyridine 100 mg tablet  take 1 tablet by mouth up to three times a day for 3 days use if . ..  (REFER TO PRESCRIPTION NOTES). 27 Tablet 0    famotidine (PEPCID) 40 mg tablet Take 1 Tablet by mouth daily. 30 Tablet 0    lidocaine (Lidoderm) 5 % Apply patch to the affected area for 12 hours a day and remove for 12 hours a day. 30 Each 2    ALPRAZolam (XANAX) 0.25 mg tablet Take 0.25 mg by mouth nightly as needed.       ammonium lactate (AMLACTIN) 12 % topical cream ammonium lactate 12 % topical cream   apply to KNEES AND ELBOWS ONCE TO twice a day      halobetasol (ULTRAVATE) 0.05 % topical cream halobetasol propionate 0.05 % topical cream   apply to affected area twice a day      Xiidra 5 % dpet instill 1 drop into both eyes twice a day      nitrofurantoin, macrocrystal-monohydrate, (MACROBID) 100 mg capsule Take 1 Capsule by mouth two (2) times a day. (Patient not taking: Reported on 2/6/2023) 40 Capsule 0        ROS   Review of Systems   Constitutional:  Negative for chills, fever and malaise/fatigue. HENT:  Negative for congestion, ear discharge and ear pain. Eyes:  Negative for blurred vision, pain and discharge. Respiratory:  Negative for cough and shortness of breath. Cardiovascular:  Negative for chest pain and palpitations. Gastrointestinal:  Negative for abdominal pain, nausea and vomiting. Genitourinary:  Negative for dysuria, frequency and urgency. Skin:  Negative for itching and rash. Neurological:  Negative for dizziness, seizures, loss of consciousness and headaches. Psychiatric/Behavioral:  Negative for substance abuse. Objective:  Vitals:    02/06/23 1335   BP: 111/73   Pulse: 93   Resp: 16   Temp: 98.1 °F (36.7 °C)   TempSrc: Temporal   SpO2: 98%   Weight: 148 lb (67.1 kg)   Height: 5' 7\" (1.702 m)   PainSc:   0 - No pain   LMP: 08/08/2021       Physical Exam  Vitals reviewed. Constitutional:       Appearance: Normal appearance. Eyes:      General: No scleral icterus. Right eye: No discharge. Left eye: No discharge. Cardiovascular:      Rate and Rhythm: Normal rate and regular rhythm. Pulmonary:      Effort: Pulmonary effort is normal. No respiratory distress. Breath sounds: Normal breath sounds. Musculoskeletal:      Cervical back: Normal range of motion and neck supple. Neurological:      Mental Status: She is alert and oriented to person, place, and time. Cranial Nerves: No cranial nerve deficit. Psychiatric:         Mood and Affect: Mood normal.         Behavior: Behavior normal.         Thought Content: Thought content normal.         Judgment: Judgment normal.         LABS     TESTS      Assessment/Plan:    1.  Recurrent UTI  - phenazopyridine (PYRIDIUM) 100 mg tablet; phenazopyridine 100 mg tablet  take 1 tablet by mouth up to three times a day for 3 days use if . ..  (REFER TO PRESCRIPTION NOTES). Dispense: 27 Tablet; Refill: 0    2. Dry skin  - ammonium lactate (AMLACTIN) 12 % topical cream; ammonium lactate 12 % topical cream.  apply to KNEES AND ELBOWS ONCE TO twice a day  Dispense: 280 g; Refill: 1    3. Keratosis  - clobetasoL (TEMOVATE) 0.05 % topical cream; Apply  to affected area two (2) times a day. Dispense: 15 g; Refill: 1    4. Need for hepatitis C screening test  - HEPATITIS C AB; Future    5. Estrogen deficiency  - FSH AND LH; Future    6. Elevated LDL cholesterol level  - LIPID PANEL; Future  - METABOLIC PANEL, COMPREHENSIVE; Future    7. Routine general medical examination at a health care facility  - LIPID PANEL; Future  - METABOLIC PANEL, COMPREHENSIVE; Future  - TSH 3RD GENERATION; Future  - CBC WITH AUTOMATED DIFF; Future         Lab review: orders written for new lab studies as appropriate; see orders    Over 30 mins spent w/ pt on appt and medical management. I have discussed the diagnosis with the patient and the intended plan as seen in the above orders. The patient has received an after-visit summary and questions were answered concerning future plans. I have discussed medication side effects and warnings with the patient as well. I have reviewed the plan of care with the patient, accepted their input and they are in agreement with the treatment goals.          Ninoska Gray MD

## 2023-02-06 NOTE — PROGRESS NOTES
Lida Silva is a 40 y.o. presents today for   Chief Complaint   Patient presents with    Physical    Neck Pain     8/10 worse 3 weeks ago. MRI results     Is someone accompanying this pt? No    Is the patient using any DME equipment during OV? No    Visit Vitals  /73 (BP 1 Location: Left upper arm, BP Patient Position: Sitting, BP Cuff Size: Adult)   Pulse 93   Temp 98.1 °F (36.7 °C) (Temporal)   Resp 16   Ht 5' 7\" (1.702 m)   Wt 148 lb (67.1 kg)   SpO2 98%   BMI 23.18 kg/m²       Depression Screening:   3 most recent PHQ Screens 2/6/2023   Little interest or pleasure in doing things Not at all   Feeling down, depressed, irritable, or hopeless Not at all   Total Score PHQ 2 0       Health Maintenance: reviewed and discussed and ordered per Provider. Health Maintenance Due   Topic Date Due    Hepatitis C Screening  Never done    Cervical cancer screen  12/28/2021    COVID-19 Vaccine (5 - Booster for Pfizer series) 12/03/2022         Coordination of Care:   1. \"Have you been to the ER, urgent care clinic since your last visit? Hospitalized since your last visit? \" No    2. \"Have you seen or consulted any other health care providers outside of the [address] [place] [address] since your last visit? \" No     3. For patients aged 39-70: Has the patient had a colonoscopy / FIT/ Cologuard? NA - based on age    If the patient is female:    4. For patients aged 41-77: Has the patient had a mammogram within the past 2 years? Yes - no Care Gap present    5. For patients aged 21-65: Has the patient had a pap smear?  NA - based on age or sex     Jair Phan

## 2023-02-07 LAB
A-G RATIO,AGRAT: 1.9 RATIO (ref 1.1–2.6)
ABSOLUTE LYMPHOCYTE COUNT, 10803: 1.6 K/UL (ref 1–4.8)
ALBUMIN SERPL-MCNC: 4.7 G/DL (ref 3.5–5)
ALP SERPL-CCNC: 65 U/L (ref 25–115)
ALT SERPL-CCNC: 9 U/L (ref 5–40)
ANION GAP SERPL CALC-SCNC: 8 MMOL/L (ref 3–15)
AST SERPL W P-5'-P-CCNC: 13 U/L (ref 10–37)
BASOPHILS # BLD: 0.1 K/UL (ref 0–0.2)
BASOPHILS NFR BLD: 1 % (ref 0–2)
BILIRUB SERPL-MCNC: 0.2 MG/DL (ref 0.2–1.2)
BUN SERPL-MCNC: 15 MG/DL (ref 6–22)
CALCIUM SERPL-MCNC: 9.9 MG/DL (ref 8.4–10.5)
CHLORIDE SERPL-SCNC: 103 MMOL/L (ref 98–110)
CHOLEST SERPL-MCNC: 240 MG/DL (ref 110–200)
CO2 SERPL-SCNC: 28 MMOL/L (ref 20–32)
CREAT SERPL-MCNC: 0.8 MG/DL (ref 0.5–1.2)
EOSINOPHIL # BLD: 0.2 K/UL (ref 0–0.5)
EOSINOPHIL NFR BLD: 2 % (ref 0–6)
ERYTHROCYTE [DISTWIDTH] IN BLOOD BY AUTOMATED COUNT: 14.2 % (ref 10–15.5)
FSH SERPL-ACNC: 9.5 MIU/ML
GLOBULIN,GLOB: 2.5 G/DL (ref 2–4)
GLOMERULAR FILTRATION RATE: >60 ML/MIN/1.73 SQ.M.
GLUCOSE SERPL-MCNC: 86 MG/DL (ref 70–99)
GRANULOCYTES,GRANS: 70 % (ref 40–75)
HCT VFR BLD AUTO: 39.5 % (ref 35.1–46.5)
HCV AB SER IA-ACNC: NORMAL
HDLC SERPL-MCNC: 4.1 MG/DL (ref 0–5)
HDLC SERPL-MCNC: 59 MG/DL
HGB BLD-MCNC: 12.6 G/DL (ref 11.7–15.5)
LDL/HDL RATIO,LDHD: 2.6
LDLC SERPL CALC-MCNC: 156 MG/DL (ref 50–99)
LH SERPL-ACNC: 11.3 MIU/ML
LYMPHOCYTES, LYMLT: 23 % (ref 20–45)
MCH RBC QN AUTO: 28 PG (ref 26–34)
MCHC RBC AUTO-ENTMCNC: 32 G/DL (ref 31–36)
MCV RBC AUTO: 89 FL (ref 80–99)
MONOCYTES # BLD: 0.3 K/UL (ref 0.1–1)
MONOCYTES NFR BLD: 5 % (ref 3–12)
NEUTROPHILS # BLD AUTO: 5 K/UL (ref 1.8–7.7)
NON-HDL CHOLESTEROL, 011976: 181 MG/DL
PLATELET # BLD AUTO: 280 K/UL (ref 140–440)
PMV BLD AUTO: 10.7 FL (ref 9–13)
POTASSIUM SERPL-SCNC: 4.6 MMOL/L (ref 3.5–5.5)
PROT SERPL-MCNC: 7.2 G/DL (ref 6.4–8.3)
RBC # BLD AUTO: 4.44 M/UL (ref 3.8–5.2)
SODIUM SERPL-SCNC: 139 MMOL/L (ref 133–145)
TRIGL SERPL-MCNC: 126 MG/DL (ref 40–149)
TSH SERPL DL<=0.005 MIU/L-ACNC: 0.43 MCU/ML (ref 0.27–4.2)
VLDLC SERPL CALC-MCNC: 25 MG/DL (ref 8–30)
WBC # BLD AUTO: 7.2 K/UL (ref 4–11)

## 2023-03-29 RX ORDER — LIDOCAINE 50 MG/G
PATCH TOPICAL
Qty: 30 PATCH | Refills: 3 | Status: SHIPPED | OUTPATIENT
Start: 2023-03-29

## 2023-03-29 RX ORDER — PHENAZOPYRIDINE HYDROCHLORIDE 100 MG/1
TABLET, FILM COATED ORAL
Qty: 27 TABLET | Refills: 1 | Status: SHIPPED | OUTPATIENT
Start: 2023-03-29

## 2023-03-29 NOTE — TELEPHONE ENCOUNTER
----- Message from Olesya Lin sent at 3/29/2023 10:44 AM EDT -----  Subject: Refill Request    QUESTIONS  Name of Medication? lidocaine (LIDODERM) 5 %  Patient-reported dosage and instructions? 5 % applied two times a day  How many days do you have left? 0  Preferred Pharmacy? [id number]  Pharmacy phone number (if available)? [phone]  ---------------------------------------------------------------------------  --------------,  Name of Medication? fluocinonide (LIDEX) 0.05 % cream  Patient-reported dosage and instructions? 0.05 % apply once daily  How many days do you have left? 0  Preferred Pharmacy? [facility]  Pharmacy phone number (if available)? [phone]  ---------------------------------------------------------------------------  --------------,  Name of Medication? phenazopyridine (PYRIDIUM) 100 MG tablet  Patient-reported dosage and instructions? 100 MG take two times a day  How many days do you have left? 0  Preferred Pharmacy? [facility]  Pharmacy phone number (if available)? [phone]  ---------------------------------------------------------------------------  --------------  CALL BACK INFO  What is the best way for the office to contact you? OK to leave message on   voicemail  Preferred Call Back Phone Number? [phone]  ---------------------------------------------------------------------------  --------------  SCRIPT ANSWERS  Relationship to Patient?  Self

## 2023-05-09 ENCOUNTER — OFFICE VISIT (OUTPATIENT)
Age: 43
End: 2023-05-09
Payer: COMMERCIAL

## 2023-05-09 VITALS — BODY MASS INDEX: 23.23 KG/M2 | WEIGHT: 148 LBS | HEIGHT: 67 IN

## 2023-05-09 DIAGNOSIS — M54.12 RADICULOPATHY, CERVICAL REGION: Primary | ICD-10-CM

## 2023-05-09 PROCEDURE — 99214 OFFICE O/P EST MOD 30 MIN: CPT | Performed by: PHYSICAL MEDICINE & REHABILITATION

## 2023-05-09 RX ORDER — KETOROLAC TROMETHAMINE 10 MG/1
10 TABLET, FILM COATED ORAL EVERY 6 HOURS
COMMUNITY
Start: 2023-04-25

## 2023-05-09 RX ORDER — ALBUTEROL SULFATE 90 UG/1
1-2 AEROSOL, METERED RESPIRATORY (INHALATION) EVERY 6 HOURS
COMMUNITY
Start: 2022-12-20

## 2023-05-09 RX ORDER — ONDANSETRON 8 MG/1
TABLET, ORALLY DISINTEGRATING ORAL
COMMUNITY
Start: 2023-04-25

## 2023-05-09 RX ORDER — LANOLIN ALCOHOL/MO/W.PET/CERES
10 CREAM (GRAM) TOPICAL NIGHTLY PRN
COMMUNITY

## 2023-05-09 RX ORDER — HYDROCODONE BITARTRATE AND ACETAMINOPHEN 5; 300 MG/1; MG/1
TABLET ORAL
COMMUNITY
Start: 2023-04-25

## 2023-05-09 RX ORDER — MONTELUKAST SODIUM 10 MG/1
10 TABLET ORAL NIGHTLY
COMMUNITY
Start: 2023-03-07

## 2023-05-09 RX ORDER — METAXALONE 800 MG/1
800 TABLET ORAL 2 TIMES DAILY PRN
Qty: 30 TABLET | Refills: 0 | Status: SHIPPED | OUTPATIENT
Start: 2023-05-09 | End: 2023-05-24

## 2023-05-09 RX ORDER — LIDOCAINE 50 MG/G
PATCH TOPICAL
Qty: 30 PATCH | Refills: 3 | Status: SHIPPED | OUTPATIENT
Start: 2023-05-09

## 2023-05-09 RX ORDER — AMOXICILLIN AND CLAVULANATE POTASSIUM 875; 125 MG/1; MG/1
TABLET, FILM COATED ORAL
COMMUNITY

## 2023-05-09 NOTE — PROGRESS NOTES
[facility]   [address]   Phone: [phone]   Fax: [phone]         Patient: Shai Noel                                                                               MRN: [id number]         YOB: 1980           AGE: 40 y.o. PCP: Edilma Nveille MD   Date:  01/09/23      Reason for Consultation: Neck Pain   Video Visit       HPI:   Shai Noel is a 40 y.o.  female who presented with neck pain which began around 2018. She went to [address] [place] [address] -Dr. Elmer Taylor with neck pain and a \"crawling\" sensation in bilateral arms. She had and  MRI of her cervical  spine diagnosed with cervical spondylosis with left foraminal narrowing at C6/7. She tried gabapentin without relief and was switched to lyrica  75mg bid but found that it gave her altered sensations in her arms. She completed a course of PT at [facility] rehab April 2022- and has been doing her home exercises since that time. She has been doing well until about 6 months ago. Repeat MRI cervical spine 10/20/22 demonstrates progression of the degenerative changes-  C4/5 spinal stenosis, C5/6 DDD spinal stenosis, C6/7 DDD, uncovertebral hypertrophy with severe left and moderate right foraminal narrowing. Neurologic symptoms: No numbness, tingling, weakness, bowel or bladder changes. No recent falls        Location: The pain is located in the neck pain   Radiation: The pain does radiate into bilateral arms - feels like something crawling her arms    Pain Score: Currently: 7/10  At Best: 2 /10  At Worst: 10/10    Quality: Pain is of a Achy, Burning, Stiff and crawling  quality. Aggravating: Pain is exacerbated by lying down, walking, carrying heavy bags   Alleviating:  The pain is alleviated by exercise, stretching      Prior Treatments:   Physical therapy completed April 2022   Previous Medications: lyrica- but

## 2023-05-17 ENCOUNTER — TELEPHONE (OUTPATIENT)
Facility: CLINIC | Age: 43
End: 2023-05-17

## 2023-05-19 ENCOUNTER — TELEPHONE (OUTPATIENT)
Facility: CLINIC | Age: 43
End: 2023-05-19

## 2023-05-19 DIAGNOSIS — N39.0 RECURRENT UTI: Primary | ICD-10-CM

## 2023-05-19 RX ORDER — PHENAZOPYRIDINE HYDROCHLORIDE 100 MG/1
100 TABLET, FILM COATED ORAL 3 TIMES DAILY PRN
Qty: 27 TABLET | Refills: 0 | Status: SHIPPED | OUTPATIENT
Start: 2023-05-19

## 2023-05-19 RX ORDER — NITROFURANTOIN 25; 75 MG/1; MG/1
100 CAPSULE ORAL 2 TIMES DAILY
Qty: 20 CAPSULE | Refills: 0 | Status: SHIPPED | OUTPATIENT
Start: 2023-05-19 | End: 2023-05-29

## 2023-08-15 ENCOUNTER — OFFICE VISIT (OUTPATIENT)
Facility: CLINIC | Age: 43
End: 2023-08-15
Payer: COMMERCIAL

## 2023-08-15 VITALS
HEIGHT: 67 IN | BODY MASS INDEX: 22.66 KG/M2 | OXYGEN SATURATION: 99 % | HEART RATE: 79 BPM | SYSTOLIC BLOOD PRESSURE: 109 MMHG | TEMPERATURE: 98.1 F | DIASTOLIC BLOOD PRESSURE: 68 MMHG | WEIGHT: 144.4 LBS | RESPIRATION RATE: 16 BRPM

## 2023-08-15 DIAGNOSIS — Z11.4 SCREENING FOR HIV WITHOUT PRESENCE OF RISK FACTORS: ICD-10-CM

## 2023-08-15 DIAGNOSIS — N39.0 RECURRENT UTI: ICD-10-CM

## 2023-08-15 DIAGNOSIS — Z28.39 INCOMPLETE IMMUNIZATION STATUS: ICD-10-CM

## 2023-08-15 DIAGNOSIS — M25.472 BILATERAL SWELLING OF FEET AND ANKLES: Primary | ICD-10-CM

## 2023-08-15 DIAGNOSIS — M25.475 BILATERAL SWELLING OF FEET AND ANKLES: Primary | ICD-10-CM

## 2023-08-15 DIAGNOSIS — M25.474 BILATERAL SWELLING OF FEET AND ANKLES: Primary | ICD-10-CM

## 2023-08-15 DIAGNOSIS — M25.471 BILATERAL SWELLING OF FEET AND ANKLES: Primary | ICD-10-CM

## 2023-08-15 PROCEDURE — 99214 OFFICE O/P EST MOD 30 MIN: CPT | Performed by: STUDENT IN AN ORGANIZED HEALTH CARE EDUCATION/TRAINING PROGRAM

## 2023-08-15 RX ORDER — PHENAZOPYRIDINE HYDROCHLORIDE 100 MG/1
100 TABLET, FILM COATED ORAL 3 TIMES DAILY PRN
Qty: 27 TABLET | Refills: 0 | Status: SHIPPED | OUTPATIENT
Start: 2023-08-15

## 2023-08-15 SDOH — ECONOMIC STABILITY: FOOD INSECURITY: WITHIN THE PAST 12 MONTHS, THE FOOD YOU BOUGHT JUST DIDN'T LAST AND YOU DIDN'T HAVE MONEY TO GET MORE.: PATIENT DECLINED

## 2023-08-15 SDOH — ECONOMIC STABILITY: INCOME INSECURITY: HOW HARD IS IT FOR YOU TO PAY FOR THE VERY BASICS LIKE FOOD, HOUSING, MEDICAL CARE, AND HEATING?: PATIENT DECLINED

## 2023-08-15 SDOH — ECONOMIC STABILITY: FOOD INSECURITY: WITHIN THE PAST 12 MONTHS, YOU WORRIED THAT YOUR FOOD WOULD RUN OUT BEFORE YOU GOT MONEY TO BUY MORE.: PATIENT DECLINED

## 2023-08-15 SDOH — ECONOMIC STABILITY: HOUSING INSECURITY
IN THE LAST 12 MONTHS, WAS THERE A TIME WHEN YOU DID NOT HAVE A STEADY PLACE TO SLEEP OR SLEPT IN A SHELTER (INCLUDING NOW)?: PATIENT REFUSED

## 2023-08-15 ASSESSMENT — PATIENT HEALTH QUESTIONNAIRE - PHQ9
SUM OF ALL RESPONSES TO PHQ QUESTIONS 1-9: 0
6. FEELING BAD ABOUT YOURSELF - OR THAT YOU ARE A FAILURE OR HAVE LET YOURSELF OR YOUR FAMILY DOWN: 0
3. TROUBLE FALLING OR STAYING ASLEEP: 0
SUM OF ALL RESPONSES TO PHQ QUESTIONS 1-9: 0
5. POOR APPETITE OR OVEREATING: 0
2. FEELING DOWN, DEPRESSED OR HOPELESS: 0
1. LITTLE INTEREST OR PLEASURE IN DOING THINGS: 0
4. FEELING TIRED OR HAVING LITTLE ENERGY: 0
SUM OF ALL RESPONSES TO PHQ QUESTIONS 1-9: 0
7. TROUBLE CONCENTRATING ON THINGS, SUCH AS READING THE NEWSPAPER OR WATCHING TELEVISION: 0
10. IF YOU CHECKED OFF ANY PROBLEMS, HOW DIFFICULT HAVE THESE PROBLEMS MADE IT FOR YOU TO DO YOUR WORK, TAKE CARE OF THINGS AT HOME, OR GET ALONG WITH OTHER PEOPLE: 0
SUM OF ALL RESPONSES TO PHQ QUESTIONS 1-9: 0
SUM OF ALL RESPONSES TO PHQ9 QUESTIONS 1 & 2: 0
9. THOUGHTS THAT YOU WOULD BE BETTER OFF DEAD, OR OF HURTING YOURSELF: 0
8. MOVING OR SPEAKING SO SLOWLY THAT OTHER PEOPLE COULD HAVE NOTICED. OR THE OPPOSITE, BEING SO FIGETY OR RESTLESS THAT YOU HAVE BEEN MOVING AROUND A LOT MORE THAN USUAL: 0

## 2023-08-15 NOTE — PROGRESS NOTES
Junaid Walsh is a 40 y.o. presents today for   Chief Complaint   Patient presents with    Joint Swelling     Is someone accompanying this pt? No     Is the patient using any DME equipment during OV? No     Health Maintenance Due   Topic Date Due    Varicella vaccine (1 of 2 - 2-dose childhood series) Never done    HIV screen  Never done    COVID-19 Vaccine (5 - Booster for Pfizer series) 12/03/2022    Cervical cancer screen  05/06/2023    Flu vaccine (1) 08/01/2023         Coordination of Care:   1. \"Have you been to the ER, urgent care clinic since your last visit? Hospitalized since your last visit? \" No    2. \"Have you seen or consulted any other health care providers outside of the [address] [place] [address] since your last visit? \" No     3. For patients aged 43-73: Has the patient had a colonoscopy / FIT/ Cologuard? NA - based on age      If the patient is female:    4. For patients aged 43-66: Has the patient had a mammogram within the past 2 years? Yes - no Care Gap present      5. For patients aged 21-65: Has the patient had a pap smear?  No    Vandana Sanders CMA
refused   Food Insecurity: Unknown    Worried About Running Out of Food in the Last Year: Patient refused    [address] in the Last Year: Patient refused   Transportation Needs: Unknown    Lack of Transportation (Medical): Not on file    Lack of Transportation (Non-Medical): Patient refused   Physical Activity: Not on file   Stress: Not on file   Social Connections: Not on file   Intimate Partner Violence: Not on file   Housing Stability: Unknown    Unable to Pay for Housing in the Last Year: Not on file    Number of [address] in the Last Year: Not on file    Unstable Housing in the Last Year: Patient refused        Current Outpatient Medications   Medication Sig Dispense Refill    phenazopyridine (PYRIDIUM) 100 MG tablet Take 1 tablet by mouth 3 times daily as needed for Pain 27 tablet 0    albuterol sulfate HFA (PROVENTIL;VENTOLIN;PROAIR) 108 (90 Base) MCG/ACT inhaler Inhale 1-2 puffs into the lungs in the morning and 1-2 puffs at noon and 1-2 puffs in the evening and 1-2 puffs before bedtime. HYDROcodone-acetaminophen 5-300 MG TABS take 1 tablet by mouth every 4 to 6 hours if needed      ketorolac (TORADOL) 10 MG tablet Take 1 tablet by mouth in the morning and 1 tablet at noon and 1 tablet in the evening and 1 tablet before bedtime. melatonin 3 MG TABS tablet Take 10 mg by mouth nightly as needed      montelukast (SINGULAIR) 10 MG tablet Take 1 tablet by mouth nightly at bedtime. ondansetron (ZOFRAN-ODT) 8 MG TBDP disintegrating tablet dissolve 1 tablet ON TONGUE every 12 hours      lidocaine (LIDODERM) 5 % Apply patch to the affected area for 12 hours a day and remove for 12 hours a day.  30 patch 3    fluocinonide (LIDEX) 0.05 % cream Apply 1 application topically daily ceived the following from [facility]: Outside name: fluocinoNIDE (LIDEX) 0.05 % topical cream 15 g 3    acetaminophen (TYLENOL) 650 MG extended release tablet Take 1 tablet by mouth in the morning and 1

## 2023-09-26 ENCOUNTER — TELEPHONE (OUTPATIENT)
Facility: CLINIC | Age: 43
End: 2023-09-26

## 2023-09-26 NOTE — TELEPHONE ENCOUNTER
----- Message from Cony Miner sent at 9/26/2023 10:19 AM EDT -----  Subject: Message to Provider    QUESTIONS  Information for Provider? follow up on last msg, pt is also needing to   know if she had chicken pox vaccine in August as well? Please advise on   this and flu shot question from previous msg, leave details on VM.  ---------------------------------------------------------------------------  --------------  Amy Lopez INFO  [phone]; OK to leave message on voicemail  ---------------------------------------------------------------------------  --------------  SCRIPT ANSWERS  Relationship to Patient?  Self

## 2023-09-26 NOTE — TELEPHONE ENCOUNTER
----- Message from Sohan Watson sent at 9/26/2023 10:18 AM EDT -----  Subject: Message to Provider    QUESTIONS  Information for Provider? Dr. Larry Gentile, pt called to find out if she   was given a flu shot back in August at her appt; she is trying to receive   one with her pharmacy, but they did not want to administer if she had it   done so recently. Practice not answering; please call pt & let her know if   this was done or not? LVM - (pt stated reception is not good and asked for   a detailed msg stating whether she had it or not so she can listen to it   when she is able). ---------------------------------------------------------------------------  --------------  Goran ALEGRIAGABO  [phone]; OK to leave message on voicemail  ---------------------------------------------------------------------------  --------------  SCRIPT ANSWERS  Relationship to Patient?  Self

## 2023-11-09 DIAGNOSIS — N39.0 RECURRENT UTI: ICD-10-CM

## 2023-11-10 RX ORDER — MELOXICAM 15 MG/1
TABLET ORAL
Qty: 30 TABLET | Refills: 0 | Status: SHIPPED | OUTPATIENT
Start: 2023-11-10

## 2023-11-10 RX ORDER — PHENAZOPYRIDINE HYDROCHLORIDE 100 MG/1
TABLET, FILM COATED ORAL
Qty: 27 TABLET | Refills: 0 | Status: SHIPPED | OUTPATIENT
Start: 2023-11-10

## 2023-12-26 ENCOUNTER — OFFICE VISIT (OUTPATIENT)
Facility: CLINIC | Age: 43
End: 2023-12-26
Payer: COMMERCIAL

## 2023-12-26 VITALS
HEART RATE: 76 BPM | DIASTOLIC BLOOD PRESSURE: 72 MMHG | BODY MASS INDEX: 22.16 KG/M2 | SYSTOLIC BLOOD PRESSURE: 108 MMHG | HEIGHT: 67 IN | TEMPERATURE: 97.9 F | WEIGHT: 141.2 LBS | OXYGEN SATURATION: 97 % | RESPIRATION RATE: 17 BRPM

## 2023-12-26 DIAGNOSIS — H04.123 DRY EYE SYNDROME OF BOTH EYES: Primary | ICD-10-CM

## 2023-12-26 DIAGNOSIS — E73.9 LACTOSE INTOLERANCE: ICD-10-CM

## 2023-12-26 PROCEDURE — 99214 OFFICE O/P EST MOD 30 MIN: CPT | Performed by: STUDENT IN AN ORGANIZED HEALTH CARE EDUCATION/TRAINING PROGRAM

## 2023-12-26 RX ORDER — LIFITEGRAST 50 MG/ML
1 SOLUTION/ DROPS OPHTHALMIC 2 TIMES DAILY
Qty: 100 EACH | Refills: 0 | Status: SHIPPED | OUTPATIENT
Start: 2023-12-26

## 2023-12-26 NOTE — PROGRESS NOTES
Junaid Walsh is a 44y.o. year old female who presents today for   Chief Complaint   Patient presents with    Follow-up       Is someone accompanying this pt? no    Is the patient using any DME equipment during OV? no    Depression Screenin/15/2023     2:34 PM 2023     1:33 PM 10/10/2022     4:05 PM   PHQ-9 Questionaire   Little interest or pleasure in doing things 0 0 0   Feeling down, depressed, or hopeless 0 0 0   Trouble falling or staying asleep, or sleeping too much 0     Feeling tired or having little energy 0     Poor appetite or overeating 0     Feeling bad about yourself - or that you are a failure or have let yourself or your family down 0     Trouble concentrating on things, such as reading the newspaper or watching television 0     Moving or speaking so slowly that other people could have noticed. Or the opposite - being so fidgety or restless that you have been moving around a lot more than usual 0     Thoughts that you would be better off dead, or of hurting yourself in some way 0     PHQ-9 Total Score 0 0 0   If you checked off any problems, how difficult have these problems made it for you to do your work, take care of things at home, or get along with other people? 0         Abuse Screening:       No data to display                Learning Assessment:  No question data found. Fall Risk:       No data to display                    Coordination of Care:   1. \"Have you been to the ER, urgent care clinic since your last visit? Hospitalized since your last visit? \" no    2. \"Have you seen or consulted any other health care providers outside of the [address] [place] [address] since your last visit? \" no    3. For patients aged 43-73: Has the patient had a colonoscopy / FIT/ Cologuard? N/a    If the patient is female:    4. For patients aged 43-66: Has the patient had a mammogram within the past 2 years? yes    5. For patients aged 21-65: Has the patient had a pap smear?  N/a    Health
by mouth nightly at bedtime. ondansetron (ZOFRAN-ODT) 8 MG TBDP disintegrating tablet dissolve 1 tablet ON TONGUE every 12 hours      lidocaine (LIDODERM) 5 % Apply patch to the affected area for 12 hours a day and remove for 12 hours a day. 30 patch 3    fluocinonide (LIDEX) 0.05 % cream Apply 1 application topically daily ceived the following from [facility]: Outside name: fluocinoNIDE (LIDEX) 0.05 % topical cream 15 g 3    acetaminophen (TYLENOL) 650 MG extended release tablet Take 1 tablet by mouth in the morning and 1 tablet at noon and 1 tablet in the evening. ALPRAZolam (XANAX) 0.25 MG tablet Take 1 tablet by mouth. ammonium lactate (AMLACTIN) 12 % cream ammonium lactate 12 % topical cream   apply to KNEES AND ELBOWS ONCE TO twice a day      famotidine (PEPCID) 40 MG tablet Take 1 tablet by mouth daily      halobetasol (ULTRAVATE) 0.05 % cream halobetasol propionate 0.05 % topical cream   apply to affected area twice a day      Lifitegrast (XIIDRA) 5 % SOLN instill 1 drop into both eyes twice a day       No current facility-administered medications for this visit. ROS   Review of Systems   Constitutional:  Negative for activity change and appetite change. HENT:  Negative for congestion, drooling, ear discharge, ear pain and facial swelling. Eyes:  Negative for pain, discharge, redness and itching. Respiratory:  Negative for shortness of breath. Cardiovascular:  Negative for leg swelling. Gastrointestinal:  Negative for abdominal pain, constipation, diarrhea and vomiting. Genitourinary:  Negative for difficulty urinating, frequency, hematuria and urgency. Musculoskeletal:  Negative for arthralgias, back pain, myalgias and neck pain. Skin:  Negative for color change. Neurological:  Negative for dizziness, seizures, numbness and headaches.    Psychiatric/Behavioral:  Negative for agitation, behavioral problems, decreased concentration, sleep disturbance

## 2024-03-26 ENCOUNTER — OFFICE VISIT (OUTPATIENT)
Facility: CLINIC | Age: 44
End: 2024-03-26
Payer: MEDICAID

## 2024-03-26 VITALS
OXYGEN SATURATION: 98 % | WEIGHT: 141 LBS | BODY MASS INDEX: 22.13 KG/M2 | HEART RATE: 80 BPM | DIASTOLIC BLOOD PRESSURE: 64 MMHG | HEIGHT: 67 IN | SYSTOLIC BLOOD PRESSURE: 94 MMHG | RESPIRATION RATE: 15 BRPM | TEMPERATURE: 97.9 F

## 2024-03-26 DIAGNOSIS — L50.9 HIVES: ICD-10-CM

## 2024-03-26 DIAGNOSIS — N39.0 RECURRENT UTI: ICD-10-CM

## 2024-03-26 DIAGNOSIS — M25.471 RIGHT ANKLE SWELLING: Primary | ICD-10-CM

## 2024-03-26 PROCEDURE — 99214 OFFICE O/P EST MOD 30 MIN: CPT | Performed by: STUDENT IN AN ORGANIZED HEALTH CARE EDUCATION/TRAINING PROGRAM

## 2024-03-26 RX ORDER — NITROFURANTOIN 25; 75 MG/1; MG/1
100 CAPSULE ORAL 2 TIMES DAILY
Qty: 20 CAPSULE | Refills: 0 | Status: SHIPPED | OUTPATIENT
Start: 2024-03-26 | End: 2024-04-05

## 2024-03-26 RX ORDER — PHENAZOPYRIDINE HYDROCHLORIDE 100 MG/1
TABLET, FILM COATED ORAL
Qty: 30 TABLET | Refills: 1 | Status: SHIPPED | OUTPATIENT
Start: 2024-03-26

## 2024-03-26 ASSESSMENT — PATIENT HEALTH QUESTIONNAIRE - PHQ9
2. FEELING DOWN, DEPRESSED OR HOPELESS: SEVERAL DAYS
SUM OF ALL RESPONSES TO PHQ9 QUESTIONS 1 & 2: 1
1. LITTLE INTEREST OR PLEASURE IN DOING THINGS: NOT AT ALL
SUM OF ALL RESPONSES TO PHQ QUESTIONS 1-9: 1

## 2024-03-26 NOTE — PROGRESS NOTES
Jayde Chawla is a 43 y.o.  female and presents with    Chief Complaint   Patient presents with    Joint Swelling    Stress           Subjective:    Moving to [place] in the next month.    Was given spironolactone for stress hives, however patient states that when she took the medication she had significant nausea so she stopped taking the medication.    Due to her upcoming move, patient needs refills on medication to help her while she establishes care.  Mostly due to her hx of recurring UTIs.    Patient also has been noting bilateral ankle swelling, however worse on the right leg.  It is not painful, and it swells and then comes down on its own.  Denies any medication that could be secondary to this.  Denies high salt intake    Patient Active Problem List   Diagnosis    Depression      Past Medical History:   Diagnosis Date    Acne     Calculus of kidney     Depression     anxiety    HPV in female     HSV infection     Hypoglycemia     PID (pelvic inflammatory disease)     Recurrent UTI     CAESAR (stress urinary incontinence, female)     Urine leukocytes       Past Surgical History:   Procedure Laterality Date    COSMETIC RATE ADJ  03/02/11    abdominal plastic    HYSTERECTOMY, VAGINAL  08/24/2021    LIPOSUCTION  3/15/13    OTHER SURGICAL HISTORY  05/04/12    Calf implant    TUBAL LIGATION  9/18/12      Family History   Problem Relation Age of Onset    Hypertension Maternal Grandfather     Hypertension Maternal Grandmother      Social History     Socioeconomic History    Marital status:      Spouse name: Not on file    Number of children: Not on file    Years of education: Not on file    Highest education level: Not on file   Occupational History    Not on file   Tobacco Use    Smoking status: Never    Smokeless tobacco: Never   Vaping Use    Vaping Use: Never used   Substance and Sexual Activity    Alcohol use: Yes     Comment: Social    Drug use: No    Sexual activity: Not on file   Other

## 2024-03-26 NOTE — PROGRESS NOTES
Jayde Chawla is a 43 y.o. year old female who presents today for   Chief Complaint   Patient presents with    Joint Swelling    Stress       Is someone accompanying this pt? no    Is the patient using any DME equipment during OV? no    Depression Screening:       3/26/2024    12:42 PM 8/15/2023     2:34 PM 2/6/2023     1:33 PM 10/10/2022     4:05 PM   PHQ-9 Questionaire   Little interest or pleasure in doing things 0 0 0 0   Feeling down, depressed, or hopeless 1 0 0 0   Trouble falling or staying asleep, or sleeping too much  0     Feeling tired or having little energy  0     Poor appetite or overeating  0     Feeling bad about yourself - or that you are a failure or have let yourself or your family down  0     Trouble concentrating on things, such as reading the newspaper or watching television  0     Moving or speaking so slowly that other people could have noticed. Or the opposite - being so fidgety or restless that you have been moving around a lot more than usual  0     Thoughts that you would be better off dead, or of hurting yourself in some way  0     PHQ-9 Total Score 1 0 0 0   If you checked off any problems, how difficult have these problems made it for you to do your work, take care of things at home, or get along with other people?  0         Abuse Screening:      3/26/2024    12:00 PM   AMB Abuse Screening   Do you ever feel afraid of your partner? N   Are you in a relationship with someone who physically or mentally threatens you? N   Is it safe for you to go home? Y       Learning Assessment:  No question data found.    Fall Risk:       No data to display                    Coordination of Care:   1. \"Have you been to the ER, urgent care clinic since your last visit?  Hospitalized since your last visit?\" no    2. \"Have you seen or consulted any other health care providers outside of the [facility] since your last visit?\" no    3. For patients aged 45-75: Has the patient had a

## 2024-03-27 ASSESSMENT — ENCOUNTER SYMPTOMS
SHORTNESS OF BREATH: 0
CONSTIPATION: 0
DIARRHEA: 0
EYE ITCHING: 0
BACK PAIN: 0
ABDOMINAL PAIN: 0
VOMITING: 0
EYE REDNESS: 0
EYE PAIN: 0
EYE DISCHARGE: 0
FACIAL SWELLING: 0
COLOR CHANGE: 0

## 2024-04-10 ENCOUNTER — TELEPHONE (OUTPATIENT)
Facility: CLINIC | Age: 44
End: 2024-04-10

## 2024-04-10 DIAGNOSIS — F41.9 ANXIETY: Primary | ICD-10-CM

## 2024-04-10 RX ORDER — ALPRAZOLAM 0.5 MG/1
0.5 TABLET ORAL 2 TIMES DAILY PRN
Qty: 15 TABLET | Refills: 0 | Status: SHIPPED | OUTPATIENT
Start: 2024-04-10 | End: 2024-04-25

## 2024-04-10 NOTE — TELEPHONE ENCOUNTER
Called pt. Placed the order for the Xanax. Reviewed the chart that this was something that she was taken before.

## 2024-04-10 NOTE — TELEPHONE ENCOUNTER
The patient called requesting to be put back on the following medication:      ALPRAZolam (XANAX) 0.25 MG tablet       The patient says she called about this 2 days ago but there was no documentation about this Rx.    Please follow up with the patient when available.    Thank you!